# Patient Record
Sex: FEMALE | Race: WHITE | NOT HISPANIC OR LATINO | Employment: UNEMPLOYED | URBAN - METROPOLITAN AREA
[De-identification: names, ages, dates, MRNs, and addresses within clinical notes are randomized per-mention and may not be internally consistent; named-entity substitution may affect disease eponyms.]

---

## 2018-10-15 ENCOUNTER — TRANSCRIBE ORDERS (OUTPATIENT)
Dept: ADMINISTRATIVE | Facility: HOSPITAL | Age: 53
End: 2018-10-15

## 2018-10-15 ENCOUNTER — APPOINTMENT (OUTPATIENT)
Dept: LAB | Facility: HOSPITAL | Age: 53
End: 2018-10-15
Attending: FAMILY MEDICINE
Payer: COMMERCIAL

## 2018-10-15 DIAGNOSIS — N39.0 URINARY TRACT INFECTION WITHOUT HEMATURIA, SITE UNSPECIFIED: ICD-10-CM

## 2018-10-15 DIAGNOSIS — N39.0 URINARY TRACT INFECTION WITHOUT HEMATURIA, SITE UNSPECIFIED: Primary | ICD-10-CM

## 2018-10-15 LAB
BACTERIA UR QL AUTO: ABNORMAL /HPF
BILIRUB UR QL STRIP: NEGATIVE
CLARITY UR: ABNORMAL
COLOR UR: YELLOW
GLUCOSE UR STRIP-MCNC: NEGATIVE MG/DL
HGB UR QL STRIP.AUTO: ABNORMAL
KETONES UR STRIP-MCNC: NEGATIVE MG/DL
LEUKOCYTE ESTERASE UR QL STRIP: ABNORMAL
NITRITE UR QL STRIP: NEGATIVE
NON-SQ EPI CELLS URNS QL MICRO: ABNORMAL /HPF
PH UR STRIP.AUTO: 6.5 [PH] (ref 5–8)
PROT UR STRIP-MCNC: ABNORMAL MG/DL
RBC #/AREA URNS AUTO: ABNORMAL /HPF
SP GR UR STRIP.AUTO: 1.02 (ref 1–1.03)
UROBILINOGEN UR QL STRIP.AUTO: 0.2 E.U./DL
WBC #/AREA URNS AUTO: ABNORMAL /HPF

## 2018-10-15 PROCEDURE — 87086 URINE CULTURE/COLONY COUNT: CPT

## 2018-10-15 PROCEDURE — 87186 SC STD MICRODIL/AGAR DIL: CPT

## 2018-10-15 PROCEDURE — 87077 CULTURE AEROBIC IDENTIFY: CPT

## 2018-10-15 PROCEDURE — 81003 URINALYSIS AUTO W/O SCOPE: CPT

## 2018-10-15 PROCEDURE — 81001 URINALYSIS AUTO W/SCOPE: CPT

## 2018-10-17 LAB — BACTERIA UR CULT: ABNORMAL

## 2018-11-09 ENCOUNTER — TELEPHONE (OUTPATIENT)
Dept: FAMILY MEDICINE CLINIC | Facility: CLINIC | Age: 53
End: 2018-11-09

## 2019-02-01 DIAGNOSIS — G40.109 SPECIAL SENSORY ATTACKS (HCC): ICD-10-CM

## 2019-02-01 DIAGNOSIS — Z00.01 ENCOUNTER FOR WELL ADULT EXAM WITH ABNORMAL FINDINGS: Primary | ICD-10-CM

## 2019-02-01 DIAGNOSIS — E78.49 OTHER HYPERLIPIDEMIA: ICD-10-CM

## 2019-02-02 ENCOUNTER — APPOINTMENT (OUTPATIENT)
Dept: LAB | Facility: CLINIC | Age: 54
End: 2019-02-02
Payer: COMMERCIAL

## 2019-02-02 DIAGNOSIS — E78.49 OTHER HYPERLIPIDEMIA: ICD-10-CM

## 2019-02-02 DIAGNOSIS — Z00.01 ENCOUNTER FOR WELL ADULT EXAM WITH ABNORMAL FINDINGS: ICD-10-CM

## 2019-02-02 DIAGNOSIS — G40.109 SPECIAL SENSORY ATTACKS (HCC): ICD-10-CM

## 2019-02-02 LAB
ALBUMIN SERPL BCP-MCNC: 4 G/DL (ref 3.5–5)
ALP SERPL-CCNC: 115 U/L (ref 46–116)
ALT SERPL W P-5'-P-CCNC: 24 U/L (ref 12–78)
ANION GAP SERPL CALCULATED.3IONS-SCNC: 2 MMOL/L (ref 4–13)
AST SERPL W P-5'-P-CCNC: 21 U/L (ref 5–45)
BILIRUB SERPL-MCNC: 0.35 MG/DL (ref 0.2–1)
BUN SERPL-MCNC: 21 MG/DL (ref 5–25)
CALCIUM SERPL-MCNC: 8.6 MG/DL (ref 8.3–10.1)
CARBAMAZEPINE SERPL-MCNC: 12.9 UG/ML (ref 4–12)
CHLORIDE SERPL-SCNC: 99 MMOL/L (ref 100–108)
CHOLEST SERPL-MCNC: 283 MG/DL (ref 50–200)
CO2 SERPL-SCNC: 33 MMOL/L (ref 21–32)
CREAT SERPL-MCNC: 0.61 MG/DL (ref 0.6–1.3)
GFR SERPL CREATININE-BSD FRML MDRD: 102 ML/MIN/1.73SQ M
GLUCOSE P FAST SERPL-MCNC: 66 MG/DL (ref 65–99)
HDLC SERPL-MCNC: 135 MG/DL (ref 40–60)
LDLC SERPL CALC-MCNC: 137 MG/DL (ref 0–100)
NONHDLC SERPL-MCNC: 148 MG/DL
POTASSIUM SERPL-SCNC: 4.1 MMOL/L (ref 3.5–5.3)
PROT SERPL-MCNC: 7.7 G/DL (ref 6.4–8.2)
SODIUM SERPL-SCNC: 134 MMOL/L (ref 136–145)
T4 FREE SERPL-MCNC: 0.75 NG/DL (ref 0.76–1.46)
TRIGL SERPL-MCNC: 56 MG/DL
TSH SERPL DL<=0.05 MIU/L-ACNC: 2.71 UIU/ML (ref 0.36–3.74)

## 2019-02-02 PROCEDURE — 80061 LIPID PANEL: CPT

## 2019-02-02 PROCEDURE — 84443 ASSAY THYROID STIM HORMONE: CPT

## 2019-02-02 PROCEDURE — 80053 COMPREHEN METABOLIC PANEL: CPT

## 2019-02-02 PROCEDURE — 80156 ASSAY CARBAMAZEPINE TOTAL: CPT

## 2019-02-02 PROCEDURE — 36415 COLL VENOUS BLD VENIPUNCTURE: CPT

## 2019-02-02 PROCEDURE — 84439 ASSAY OF FREE THYROXINE: CPT

## 2019-02-14 ENCOUNTER — OFFICE VISIT (OUTPATIENT)
Dept: FAMILY MEDICINE CLINIC | Facility: CLINIC | Age: 54
End: 2019-02-14
Payer: COMMERCIAL

## 2019-02-14 VITALS
WEIGHT: 128.8 LBS | HEART RATE: 78 BPM | BODY MASS INDEX: 22.82 KG/M2 | SYSTOLIC BLOOD PRESSURE: 120 MMHG | DIASTOLIC BLOOD PRESSURE: 72 MMHG | HEIGHT: 63 IN | OXYGEN SATURATION: 97 %

## 2019-02-14 DIAGNOSIS — R56.9 SEIZURES (HCC): ICD-10-CM

## 2019-02-14 DIAGNOSIS — R56.9 SEIZURES (HCC): Primary | ICD-10-CM

## 2019-02-14 DIAGNOSIS — Z00.00 ANNUAL PHYSICAL EXAM: ICD-10-CM

## 2019-02-14 DIAGNOSIS — E78.2 MIXED HYPERLIPIDEMIA: Primary | ICD-10-CM

## 2019-02-14 PROCEDURE — 3008F BODY MASS INDEX DOCD: CPT | Performed by: FAMILY MEDICINE

## 2019-02-14 PROCEDURE — 1036F TOBACCO NON-USER: CPT | Performed by: FAMILY MEDICINE

## 2019-02-14 PROCEDURE — 99396 PREV VISIT EST AGE 40-64: CPT | Performed by: FAMILY MEDICINE

## 2019-02-14 RX ORDER — CARBAMAZEPINE 400 MG/1
400 TABLET, EXTENDED RELEASE ORAL 2 TIMES DAILY
COMMUNITY
End: 2019-02-14 | Stop reason: SDUPTHER

## 2019-02-14 RX ORDER — CARBAMAZEPINE 400 MG/1
400 TABLET, EXTENDED RELEASE ORAL 2 TIMES DAILY
COMMUNITY
End: 2020-04-16 | Stop reason: SDUPTHER

## 2019-02-14 RX ORDER — CARBAMAZEPINE 400 MG/1
400 TABLET, EXTENDED RELEASE ORAL 2 TIMES DAILY
Qty: 90 TABLET | Refills: 3 | Status: SHIPPED | OUTPATIENT
Start: 2019-02-14 | End: 2019-02-14 | Stop reason: CLARIF

## 2019-02-14 RX ORDER — CHLORAL HYDRATE 500 MG
1000 CAPSULE ORAL DAILY
COMMUNITY

## 2019-02-14 RX ORDER — VITAMIN B COMPLEX
1 CAPSULE ORAL DAILY
COMMUNITY

## 2019-02-14 RX ORDER — CARBAMAZEPINE 400 MG/1
400 TABLET, EXTENDED RELEASE ORAL 2 TIMES DAILY
Qty: 180 TABLET | Refills: 3 | Status: SHIPPED | OUTPATIENT
Start: 2019-02-14 | End: 2020-04-16 | Stop reason: SDUPTHER

## 2019-02-14 NOTE — ASSESSMENT & PLAN NOTE
Patient presents for mixed hyperlipidemia review of laboratory work showing cholesterol elevated at 283 now however her HDL is at 135  triglycerides good at 56 TSH 2 7 other labs including kidney liver functions are all good

## 2019-02-14 NOTE — PATIENT INSTRUCTIONS

## 2019-02-14 NOTE — PROGRESS NOTES
Assessment/Plan:       Problem List Items Addressed This Visit        Other    Mixed hyperlipidemia - Primary     Patient presents for mixed hyperlipidemia review of laboratory work showing cholesterol elevated at 283 now however her HDL is at 135  triglycerides good at 56 TSH 2 7 other labs including kidney liver functions are all good         Seizures ([facility] [address] )     Patient has history of seizure disorder on Tegretol carbamazepine levels are evaluated at this point on the current dosage of the medication  Carbamazepine level slightly high at 12 9 she will reduce the dosage to 400 mg twice daily now every day and re-evaluate the level at next office visit         Relevant Orders    Carbamazepine level, total    Carbamazepine level, free    Annual physical exam      Patient here for her annual exam and discussion regarding her lab work and medical problems                 Subjective:      Patient ID: Anel Rooney is a 52 y o  female  Presents for lab review and med eval   Feeling good overall      The following portions of the patient's history were reviewed and updated as appropriate: allergies, current medications, past family history, past medical history, past social history, past surgical history and problem list     Review of Systems   Constitutional: Negative for chills, fatigue and fever  HENT: Negative for congestion, nosebleeds, rhinorrhea, sinus pressure and sore throat  Eyes: Negative for discharge and redness  Respiratory: Negative for cough and shortness of breath  Cardiovascular: Negative for chest pain, palpitations and leg swelling  Gastrointestinal: Negative for abdominal pain, blood in stool and nausea  Endocrine: Negative for cold intolerance, heat intolerance and polyuria  Genitourinary: Negative for dysuria and frequency  Musculoskeletal: Negative for arthralgias, back pain and myalgias  Skin: Negative for rash     Neurological: Negative for dizziness, weakness and headaches  Hematological: Negative for adenopathy  Psychiatric/Behavioral: Negative for behavioral problems and sleep disturbance  The patient is not nervous/anxious  Objective:      /72 (BP Location: Left arm, Patient Position: Sitting)   Pulse 78   Ht 5' 3" (1 6 m)   Wt 58 4 kg (128 lb 12 8 oz)   SpO2 97%   BMI 22 82 kg/m²        Physical Exam   Constitutional: She is oriented to person, place, and time  She appears well-developed and well-nourished  No distress  HENT:   Head: Normocephalic and atraumatic  Right Ear: External ear normal    Left Ear: External ear normal    Nose: Nose normal    Mouth/Throat: Oropharynx is clear and moist  No oropharyngeal exudate  Eyes: Pupils are equal, round, and reactive to light  Conjunctivae and EOM are normal  Right eye exhibits no discharge  Left eye exhibits no discharge  No scleral icterus  Neck: Normal range of motion  No JVD present  No thyromegaly present  Cardiovascular: Normal rate, regular rhythm and normal heart sounds  No murmur heard  Pulmonary/Chest: Effort normal  She has no wheezes  She has no rales  She exhibits no tenderness  Abdominal: Soft  Bowel sounds are normal  She exhibits no distension and no mass  There is no tenderness  Musculoskeletal: Normal range of motion  She exhibits no edema, tenderness or deformity  Lymphadenopathy:     She has no cervical adenopathy  Neurological: She is alert and oriented to person, place, and time  She has normal reflexes  She displays normal reflexes  No cranial nerve deficit  Coordination normal    Skin: Skin is warm and dry  No rash noted  Psychiatric: She has a normal mood and affect  Her behavior is normal  Judgment and thought content normal    Nursing note and vitals reviewed  Data:    Laboratory Results: I have personally reviewed the pertinent laboratory results/reports   Radiology/Other Diagnostic Testing Results: I have personally reviewed pertinent reports  No results found for: WBC, HGB, HCT, MCV, PLT  Lab Results   Component Value Date    K 4 1 02/02/2019    CL 99 (L) 02/02/2019    CO2 33 (H) 02/02/2019    BUN 21 02/02/2019    CREATININE 0 61 02/02/2019    GLUF 66 02/02/2019    CALCIUM 8 6 02/02/2019    AST 21 02/02/2019    ALT 24 02/02/2019    ALKPHOS 115 02/02/2019    EGFR 102 02/02/2019     Lab Results   Component Value Date    CHOLESTEROL 283 (H) 02/02/2019     Lab Results   Component Value Date     (H) 02/02/2019     Lab Results   Component Value Date    LDLCALC 137 (H) 02/02/2019     Lab Results   Component Value Date    TRIG 56 02/02/2019     No results found for: [place], [place]  Lab Results   Component Value Date    XHG4NQHMZKSU 2 710 02/02/2019     No results found for: HGBA1C  No results found for: LARA Renee, DO

## 2019-02-14 NOTE — ASSESSMENT & PLAN NOTE
Patient has history of seizure disorder on Tegretol carbamazepine levels are evaluated at this point on the current dosage of the medication    Carbamazepine level slightly high at 12 9 she will reduce the dosage to 400 mg twice daily now every day and re-evaluate the level at next office visit

## 2019-05-06 ENCOUNTER — TELEPHONE (OUTPATIENT)
Dept: FAMILY MEDICINE CLINIC | Facility: CLINIC | Age: 54
End: 2019-05-06

## 2019-05-07 PROBLEM — Z00.00 ANNUAL PHYSICAL EXAM: Status: ACTIVE | Noted: 2019-05-07

## 2020-01-22 ENCOUNTER — TELEPHONE (OUTPATIENT)
Dept: FAMILY MEDICINE CLINIC | Facility: CLINIC | Age: 55
End: 2020-01-22

## 2020-01-22 DIAGNOSIS — E78.2 MIXED HYPERLIPIDEMIA: Primary | ICD-10-CM

## 2020-01-22 DIAGNOSIS — R56.9 SEIZURES (HCC): ICD-10-CM

## 2020-01-22 DIAGNOSIS — Z00.00 ANNUAL PHYSICAL EXAM: ICD-10-CM

## 2020-01-22 NOTE — TELEPHONE ENCOUNTER
Notify patient labs ordered for herself and for her  they can go to a [employer] and not have to stop here for the paper request

## 2020-01-22 NOTE — TELEPHONE ENCOUNTER
Patient needs orders for yearly labs, don't forget to include tegritol level   She will  on Thursday

## 2020-04-16 ENCOUNTER — TELEPHONE (OUTPATIENT)
Dept: FAMILY MEDICINE CLINIC | Facility: CLINIC | Age: 55
End: 2020-04-16

## 2020-04-16 ENCOUNTER — TELEMEDICINE (OUTPATIENT)
Dept: FAMILY MEDICINE CLINIC | Facility: CLINIC | Age: 55
End: 2020-04-16
Payer: COMMERCIAL

## 2020-04-16 ENCOUNTER — TELEPHONE (OUTPATIENT)
Dept: ADMINISTRATIVE | Facility: OTHER | Age: 55
End: 2020-04-16

## 2020-04-16 DIAGNOSIS — R56.9 SEIZURES (HCC): ICD-10-CM

## 2020-04-16 DIAGNOSIS — Z12.31 VISIT FOR SCREENING MAMMOGRAM: ICD-10-CM

## 2020-04-16 DIAGNOSIS — E78.2 MIXED HYPERLIPIDEMIA: Primary | ICD-10-CM

## 2020-04-16 PROCEDURE — 99215 OFFICE O/P EST HI 40 MIN: CPT | Performed by: FAMILY MEDICINE

## 2020-04-16 RX ORDER — CARBAMAZEPINE 400 MG/1
400 TABLET, EXTENDED RELEASE ORAL 2 TIMES DAILY
Qty: 200 TABLET | Refills: 3 | Status: SHIPPED | OUTPATIENT
Start: 2020-04-16 | End: 2021-02-19 | Stop reason: SDUPTHER

## 2020-04-16 RX ORDER — CARBAMAZEPINE 400 MG/1
400 TABLET, EXTENDED RELEASE ORAL 2 TIMES DAILY
Qty: 200 TABLET | Refills: 2 | Status: SHIPPED | OUTPATIENT
Start: 2020-04-16 | End: 2022-04-14 | Stop reason: ALTCHOICE

## 2020-05-13 LAB
ALBUMIN SERPL-MCNC: 4.1 G/DL (ref 3.8–4.9)
ALBUMIN/GLOB SERPL: 1.7 {RATIO} (ref 1.2–2.2)
ALP SERPL-CCNC: 91 IU/L (ref 39–117)
ALT SERPL-CCNC: 16 IU/L (ref 0–32)
AST SERPL-CCNC: 25 IU/L (ref 0–40)
BASOPHILS # BLD AUTO: 0 X10E3/UL (ref 0–0.2)
BASOPHILS NFR BLD AUTO: 1 %
BILIRUB SERPL-MCNC: 0.3 MG/DL (ref 0–1.2)
BUN SERPL-MCNC: 18 MG/DL (ref 6–24)
BUN/CREAT SERPL: 25 (ref 9–23)
CALCIUM SERPL-MCNC: 9 MG/DL (ref 8.7–10.2)
CARBAMAZEPINE SERPL-MCNC: 8.2 UG/ML (ref 4–12)
CHLORIDE SERPL-SCNC: 100 MMOL/L (ref 96–106)
CHOLEST SERPL-MCNC: 240 MG/DL (ref 100–199)
CO2 SERPL-SCNC: 26 MMOL/L (ref 20–29)
CREAT SERPL-MCNC: 0.72 MG/DL (ref 0.57–1)
EOSINOPHIL # BLD AUTO: 0.1 X10E3/UL (ref 0–0.4)
EOSINOPHIL NFR BLD AUTO: 2 %
ERYTHROCYTE [DISTWIDTH] IN BLOOD BY AUTOMATED COUNT: 12.6 % (ref 11.7–15.4)
GLOBULIN SER-MCNC: 2.4 G/DL (ref 1.5–4.5)
GLUCOSE SERPL-MCNC: 82 MG/DL (ref 65–99)
HCT VFR BLD AUTO: 42.4 % (ref 34–46.6)
HDLC SERPL-MCNC: 97 MG/DL
HGB BLD-MCNC: 14.2 G/DL (ref 11.1–15.9)
IMM GRANULOCYTES # BLD: 0 X10E3/UL (ref 0–0.1)
IMM GRANULOCYTES NFR BLD: 0 %
LDLC SERPL CALC-MCNC: 124 MG/DL (ref 0–99)
LYMPHOCYTES # BLD AUTO: 1.6 X10E3/UL (ref 0.7–3.1)
LYMPHOCYTES NFR BLD AUTO: 43 %
MCH RBC QN AUTO: 30.5 PG (ref 26.6–33)
MCHC RBC AUTO-ENTMCNC: 33.5 G/DL (ref 31.5–35.7)
MCV RBC AUTO: 91 FL (ref 79–97)
MONOCYTES # BLD AUTO: 0.3 X10E3/UL (ref 0.1–0.9)
MONOCYTES NFR BLD AUTO: 7 %
NEUTROPHILS # BLD AUTO: 1.7 X10E3/UL (ref 1.4–7)
NEUTROPHILS NFR BLD AUTO: 47 %
PLATELET # BLD AUTO: 165 X10E3/UL (ref 150–450)
POTASSIUM SERPL-SCNC: 4.5 MMOL/L (ref 3.5–5.2)
PROT SERPL-MCNC: 6.5 G/DL (ref 6–8.5)
RBC # BLD AUTO: 4.66 X10E6/UL (ref 3.77–5.28)
SL AMB EGFR AFRICAN AMERICAN: 108 ML/MIN/1.73
SL AMB EGFR NON AFRICAN AMERICAN: 94 ML/MIN/1.73
SL AMB VLDL CHOLESTEROL CALC: 19 MG/DL (ref 5–40)
SODIUM SERPL-SCNC: 140 MMOL/L (ref 134–144)
TRIGL SERPL-MCNC: 96 MG/DL (ref 0–149)
TSH SERPL DL<=0.005 MIU/L-ACNC: 3.44 UIU/ML (ref 0.45–4.5)
WBC # BLD AUTO: 3.7 X10E3/UL (ref 3.4–10.8)

## 2020-07-07 LAB — HCV AB SER-ACNC: NORMAL

## 2020-09-03 ENCOUNTER — TELEPHONE (OUTPATIENT)
Dept: ADMINISTRATIVE | Facility: OTHER | Age: 55
End: 2020-09-03

## 2020-09-03 ENCOUNTER — OFFICE VISIT (OUTPATIENT)
Dept: FAMILY MEDICINE CLINIC | Facility: CLINIC | Age: 55
End: 2020-09-03
Payer: COMMERCIAL

## 2020-09-03 VITALS
DIASTOLIC BLOOD PRESSURE: 82 MMHG | SYSTOLIC BLOOD PRESSURE: 122 MMHG | WEIGHT: 126.6 LBS | TEMPERATURE: 98.3 F | OXYGEN SATURATION: 98 % | BODY MASS INDEX: 23.3 KG/M2 | HEART RATE: 78 BPM | HEIGHT: 62 IN

## 2020-09-03 DIAGNOSIS — R56.9 SEIZURES (HCC): ICD-10-CM

## 2020-09-03 DIAGNOSIS — E78.2 MIXED HYPERLIPIDEMIA: Primary | ICD-10-CM

## 2020-09-03 DIAGNOSIS — M81.0 AGE-RELATED OSTEOPOROSIS WITHOUT CURRENT PATHOLOGICAL FRACTURE: ICD-10-CM

## 2020-09-03 DIAGNOSIS — Z00.00 ANNUAL PHYSICAL EXAM: ICD-10-CM

## 2020-09-03 PROCEDURE — 1036F TOBACCO NON-USER: CPT | Performed by: FAMILY MEDICINE

## 2020-09-03 PROCEDURE — 99396 PREV VISIT EST AGE 40-64: CPT | Performed by: FAMILY MEDICINE

## 2020-09-03 NOTE — ASSESSMENT & PLAN NOTE
Seizure disorder longstanding by history continue with Tegretol carbamazepine no change in dosage levels  Reviewed and followed by Neurology annually

## 2020-09-03 NOTE — PATIENT INSTRUCTIONS
Osteoporosis   WHAT YOU NEED TO KNOW:   Osteoporosis is a long-term medical condition that causes your bones to become weak, brittle, and more likely to fracture  Osteoporosis occurs when your body absorbs more bone than it makes  It is also caused by a lack of calcium and estrogen (female hormone)  DISCHARGE INSTRUCTIONS:   Return to the emergency department if:   · You have severe pain  Contact your healthcare provider if:   · You have increasing pain after a fall  · You have pain when you do your daily activities  · You have questions or concerns about your condition or care  Medicines:   · Medicines  may be given to prevent bone loss, build new bone, and increase estrogen  These medicines may be given as a pill or injection  Ask your healthcare provider for more information  · Take your medicine as directed  Contact your healthcare provider if you think your medicine is not helping or if you have side effects  Tell him of her if you are allergic to any medicine  Keep a list of the medicines, vitamins, and herbs you take  Include the amounts, and when and why you take them  Bring the list or the pill bottles to follow-up visits  Carry your medicine list with you in case of an emergency  Prevent bone loss:   · Eat healthy foods that are high in calcium  This helps keep your bones strong  Good sources of calcium are milk, cheese, broccoli, tofu, almonds, and canned salmon and sardines  · Increase your vitamin D intake  Vitamin D is in fish oils, some vegetables, and fortified milk, cereal, and bread  Vitamin D is also formed in the skin when it is exposed to the sun  Ask your healthcare provider how much sunlight is safe for you  · Drink liquids as directed  Ask your healthcare provider how much liquid to drink each day and which liquids are best for you  Do not have alcohol or caffeine  They decrease bone mineral density, which can weaken your bones  · Exercise regularly    Ask your healthcare provider about the best exercise plan for you  Weight bearing exercise for 30 minutes, 3 times a week can help build and strengthen bone  · Do not smoke  Nicotine and other chemicals in cigarettes and cigars can cause lung damage  Ask your healthcare provider for information if you currently smoke and need help to quit  E-cigarettes or smokeless tobacco still contain nicotine  Talk to your healthcare provider before you use these products  · Go to physical therapy as directed  A physical therapist teaches you exercises to help improve movement and muscle strength  Follow up with your healthcare provider as directed:  Write down your questions so you remember to ask them during your visits  © 2017 [facility] Information is for End User's use only and may not be sold, redistributed or otherwise used for commercial purposes  All illustrations and images included in CareNotes® are the copyrighted property of [employer]  or Sigifredo Escobedo  The above information is an  only  It is not intended as medical advice for individual conditions or treatments  Talk to your doctor, nurse or pharmacist before following any medical regimen to see if it is safe and effective for you

## 2020-09-03 NOTE — ASSESSMENT & PLAN NOTE
Osteoporosis noted by bone densitometry test DEXA scan done July 7th I reviewed this with her at this time and she is currently taking supplements with calcium and vitamin-D I recommend 1500 mg of calcium and vitamin-D minimum of 800 units which she is getting in her supplement but I recommend additional vitamin-D supplement to take with that at this 0 2 4958-3266 units daily and vitamin-D level can be repeated in about 3 months up to 6 months from now

## 2020-09-03 NOTE — TELEPHONE ENCOUNTER
Upon review of the In Basket request we were able to locate, review, and update the patient chart as requested for Mammogram     Any additional questions or concerns should be emailed to the Practice Liaisons via [email or web address] email, please do not reply via In Basket      Thank you  [place]

## 2020-09-03 NOTE — TELEPHONE ENCOUNTER
----- Message from Romana Gayle sent at 9/3/2020 10:05 AM EDT -----  Regarding: DEXA  09/03/20 10:05 AM    Hello, our patient Saint Barthelemy has had DEXA Scan completed/performed  Please assist in updating the patient chart by pulling a previous Electronic Medical Record (EMR) document  The previous EMR is Care Everywhere, [facility]  The date of service is 7/2020  Thank you,  THU Da Silva PG

## 2020-09-03 NOTE — PROGRESS NOTES
ADULT ANNUAL [address]    NAME: [place]  AGE: 60 y o  SEX: female  : 1965     DATE: 9/3/2020     Assessment and Plan:     Problem List Items Addressed This Visit        Musculoskeletal and Integument    Age-related osteoporosis without current pathological fracture     Osteoporosis noted by bone densitometry test DEXA scan done  I reviewed this with her at this time and she is currently taking supplements with calcium and vitamin-D I recommend 1500 mg of calcium and vitamin-D minimum of 800 units which she is getting in her supplement but I recommend additional vitamin-D supplement to take with that at this 0 2 [id number] units daily and vitamin-D level can be repeated in about 3 months up to 6 months from now            Other    Mixed hyperlipidemia - Primary     Patient returns today to discuss hyperlipidemia working with Omega 3 fish oils and fat-free diet avoiding saturated fats discussed again and diet information provided for her all labs reviewed at this time         Seizures ([address] )      Seizure disorder longstanding by history continue with Tegretol carbamazepine no change in dosage levels  Reviewed and followed by Neurology annually               Immunizations and preventive care screenings were discussed with patient today  Appropriate education was printed on patient's after visit summary  Counseling:  · Exercise: the importance of regular exercise/physical activity was discussed  Recommend exercise 3-5 times per week for at least 30 minutes  No follow-ups on file  BMI Counseling: Body mass index is 23 16 kg/m²  The BMI is below normal  Patient was advised to gain weight  Chief Complaint:     Chief Complaint   Patient presents with    Follow-up     Blood work done on 2020, Dexa and Mammo done on 2020 results are in care everywhere     HM     Due HIV screen   BMI note started  Physical Exam      History of Present Illness:     Adult Annual Physical   Patient here for a comprehensive physical exam  The patient reports problems - Seizure, hyperlipidemia, osteoporosis  Diet and Physical Activity  · Diet/Nutrition: well balanced diet, heart healthy (low sodium) diet, low fat diet and consuming 3-5 servings of fruits/vegetables daily  · Exercise: walking  Depression Screening  PHQ-9 Depression Screening    PHQ-9:    Frequency of the following problems over the past two weeks:            General Health  · Sleep: sleeps well  · Hearing: normal - bilateral   · Vision: no vision problems  · Dental: regular dental visits  /GYN Health  · Patient is:  · Last menstrual period:   · Contraceptive method:     Review of Systems:     Review of Systems   Constitutional: Negative for chills, fatigue and fever  HENT: Negative for congestion, nosebleeds, rhinorrhea, sinus pressure and sore throat  Eyes: Negative for discharge and redness  Respiratory: Negative for cough and shortness of breath  Cardiovascular: Negative for chest pain, palpitations and leg swelling  Gastrointestinal: Negative for abdominal pain, blood in stool and nausea  Endocrine: Negative for cold intolerance, heat intolerance and polyuria  Genitourinary: Negative for dysuria and frequency  Musculoskeletal: Negative for arthralgias, back pain and myalgias  Skin: Negative for rash  Neurological: Negative for dizziness, weakness and headaches  Hematological: Negative for adenopathy  Psychiatric/Behavioral: Negative for behavioral problems and sleep disturbance  The patient is not nervous/anxious  Past Medical History:     Past Medical History:   Diagnosis Date    Hyperlipidemia     Seizures ([facility] [address] )       Past Surgical History:     History reviewed  No pertinent surgical history     Social History:     E-Cigarette/Vaping    E-Cigarette Use Never User      E-Cigarette/Vaping Substances  Nicotine No     THC No     CBD No     Flavoring No     Other No     Unknown No      Social History     Socioeconomic History    Marital status: /Civil Union     Spouse name: None    Number of children: None    Years of education: None    Highest education level: None   Occupational History    None   Social Needs    Financial resource strain: None    Food insecurity     Worry: None     Inability: None    Transportation needs     Medical: None     Non-medical: None   Tobacco Use    Smoking status: Never Smoker    Smokeless tobacco: Never Used   Substance and Sexual Activity    Alcohol use: Not Currently    Drug use: Never    Sexual activity: Yes   Lifestyle    Physical activity     Days per week: None     Minutes per session: None    Stress: None   Relationships    Social connections     Talks on phone: None     Gets together: None     Attends L[employer] service: None     Active member of club or organization: None     Attends meetings of clubs or organizations: None     Relationship status: None    Intimate partner violence     Fear of current or ex partner: None     Emotionally abused: None     Physically abused: None     Forced sexual activity: None   Other Topics Concern    None   Social History Narrative    None      Family History:     Family History   Problem Relation Age of Onset    No Known Problems Mother     Cancer Father     Cancer Sister       Current Medications:     Current Outpatient Medications   Medication Sig Dispense Refill    b complex vitamins capsule Take 1 capsule by mouth daily      carBAMazepine (TEGretol XR) 400 mg 12 hr tablet Take 1 tablet (400 mg total) by mouth 2 (two) times a day 200 tablet 2    Multiple Vitamin (MULTI-VITAMIN DAILY PO) Take by mouth      Omega-3 Fatty Acids (FISH OIL) 1,000 mg Take 1,000 mg by mouth daily      TEGRETOL- MG 12 hr tablet Take 1 tablet (400 mg total) by mouth 2 (two) times a day 200 tablet 3     No current facility-administered medications for this visit  Allergies:     No Known Allergies   Physical Exam:     /82 (BP Location: Left arm, Patient Position: Sitting, Cuff Size: Adult)   Pulse 78   Temp 98 3 °F (36 8 °C) (Tympanic)   Ht 5' 2" (1 575 m)   Wt 57 4 kg (126 lb 9 6 oz)   SpO2 98%   BMI 23 16 kg/m²     Physical Exam  Vitals signs reviewed  Constitutional:       Appearance: Normal appearance  She is normal weight  HENT:      Head: Normocephalic and atraumatic  Right Ear: Tympanic membrane, ear canal and external ear normal       Left Ear: Tympanic membrane, ear canal and external ear normal       Nose: Nose normal       Mouth/Throat:      Mouth: Mucous membranes are moist    Eyes:      Extraocular Movements: Extraocular movements intact  Conjunctiva/sclera: Conjunctivae normal       Pupils: Pupils are equal, round, and reactive to light  Neck:      Musculoskeletal: Normal range of motion  Cardiovascular:      Rate and Rhythm: Normal rate and regular rhythm  Pulmonary:      Effort: Pulmonary effort is normal    Abdominal:      General: Abdomen is flat  Skin:     General: Skin is warm  Capillary Refill: Capillary refill takes less than 2 seconds  Neurological:      General: No focal deficit present  Mental Status: She is alert  Psychiatric:         Mood and Affect: Mood normal          Thought Content:  Thought content normal          Judgment: Judgment normal           DO JOSE A Norris 99

## 2020-09-03 NOTE — TELEPHONE ENCOUNTER
Upon review of the In Basket request we were able to locate, review, and update the patient chart as requested for DEXA Scan and Mammogram     Any additional questions or concerns should be emailed to the Practice Liaisons via [email or web address] email, please do not reply via In Basket      Thank you  Lincoln Jaeger

## 2020-09-03 NOTE — TELEPHONE ENCOUNTER
----- Message from Romana Garrett sent at 9/3/2020 10:06 AM EDT -----  Regarding: Mammo  09/03/20 10:06 AM    Hello, our patient [place] Bart[place]my has had Mammogram completed/performed  Please assist in updating the patient chart by pulling a previous Electronic Medical Record (EMR) document  The previous EMR is Care Everywhere, [facility]  The date of service is 7/2020  Thank you,  THU Ross PG

## 2021-02-19 DIAGNOSIS — E55.9 VITAMIN D DEFICIENCY: Primary | ICD-10-CM

## 2021-02-19 DIAGNOSIS — R56.9 SEIZURES (HCC): ICD-10-CM

## 2021-02-19 RX ORDER — CARBAMAZEPINE 400 MG/1
400 TABLET, EXTENDED RELEASE ORAL 2 TIMES DAILY
Qty: 200 TABLET | Refills: 3 | Status: SHIPPED | OUTPATIENT
Start: 2021-02-19 | End: 2022-04-14 | Stop reason: SDUPTHER

## 2021-04-13 DIAGNOSIS — Z23 ENCOUNTER FOR IMMUNIZATION: ICD-10-CM

## 2021-05-04 LAB
25(OH)D3+25(OH)D2 SERPL-MCNC: 56.5 NG/ML (ref 30–100)
ALBUMIN SERPL-MCNC: 4.2 G/DL (ref 3.8–4.9)
ALBUMIN/GLOB SERPL: 1.7 {RATIO} (ref 1.2–2.2)
ALP SERPL-CCNC: 96 IU/L (ref 39–117)
ALT SERPL-CCNC: 15 IU/L (ref 0–32)
AST SERPL-CCNC: 19 IU/L (ref 0–40)
BASOPHILS # BLD AUTO: 0 X10E3/UL (ref 0–0.2)
BASOPHILS NFR BLD AUTO: 1 %
BILIRUB SERPL-MCNC: 0.3 MG/DL (ref 0–1.2)
BUN SERPL-MCNC: 19 MG/DL (ref 6–24)
BUN/CREAT SERPL: 26 (ref 9–23)
CALCIUM SERPL-MCNC: 9 MG/DL (ref 8.7–10.2)
CARBAMAZEPINE FREE SERPL-MCNC: 1.7 UG/ML (ref 0.6–4.2)
CARBAMAZEPINE SERPL-MCNC: 6.7 UG/ML (ref 4–12)
CHLORIDE SERPL-SCNC: 97 MMOL/L (ref 96–106)
CHOLEST SERPL-MCNC: 257 MG/DL (ref 100–199)
CO2 SERPL-SCNC: 29 MMOL/L (ref 20–29)
CREAT SERPL-MCNC: 0.74 MG/DL (ref 0.57–1)
EOSINOPHIL # BLD AUTO: 0.3 X10E3/UL (ref 0–0.4)
EOSINOPHIL NFR BLD AUTO: 6 %
ERYTHROCYTE [DISTWIDTH] IN BLOOD BY AUTOMATED COUNT: 11.9 % (ref 11.7–15.4)
GLOBULIN SER-MCNC: 2.5 G/DL (ref 1.5–4.5)
GLUCOSE SERPL-MCNC: 74 MG/DL (ref 65–99)
HCT VFR BLD AUTO: 44.7 % (ref 34–46.6)
HDLC SERPL-MCNC: 108 MG/DL
HGB BLD-MCNC: 14.8 G/DL (ref 11.1–15.9)
IMM GRANULOCYTES # BLD: 0 X10E3/UL (ref 0–0.1)
IMM GRANULOCYTES NFR BLD: 0 %
LDLC SERPL CALC-MCNC: 137 MG/DL (ref 0–99)
LYMPHOCYTES # BLD AUTO: 1.8 X10E3/UL (ref 0.7–3.1)
LYMPHOCYTES NFR BLD AUTO: 38 %
MCH RBC QN AUTO: 30.1 PG (ref 26.6–33)
MCHC RBC AUTO-ENTMCNC: 33.1 G/DL (ref 31.5–35.7)
MCV RBC AUTO: 91 FL (ref 79–97)
MONOCYTES # BLD AUTO: 0.4 X10E3/UL (ref 0.1–0.9)
MONOCYTES NFR BLD AUTO: 8 %
NEUTROPHILS # BLD AUTO: 2.2 X10E3/UL (ref 1.4–7)
NEUTROPHILS NFR BLD AUTO: 47 %
PLATELET # BLD AUTO: 193 X10E3/UL (ref 150–450)
POTASSIUM SERPL-SCNC: 4.6 MMOL/L (ref 3.5–5.2)
PROT SERPL-MCNC: 6.7 G/DL (ref 6–8.5)
RBC # BLD AUTO: 4.92 X10E6/UL (ref 3.77–5.28)
SL AMB EGFR AFRICAN AMERICAN: 104 ML/MIN/1.73
SL AMB EGFR NON AFRICAN AMERICAN: 90 ML/MIN/1.73
SL AMB VLDL CHOLESTEROL CALC: 12 MG/DL (ref 5–40)
SODIUM SERPL-SCNC: 137 MMOL/L (ref 134–144)
TRIGL SERPL-MCNC: 73 MG/DL (ref 0–149)
TSH SERPL DL<=0.005 MIU/L-ACNC: 3.51 UIU/ML (ref 0.45–4.5)
WBC # BLD AUTO: 4.6 X10E3/UL (ref 3.4–10.8)

## 2021-09-13 ENCOUNTER — TELEPHONE (OUTPATIENT)
Dept: ADMINISTRATIVE | Facility: OTHER | Age: 56
End: 2021-09-13

## 2021-09-13 NOTE — TELEPHONE ENCOUNTER
----- Message from Romana Arcos sent at 9/10/2021  7:06 AM EDT -----  Regarding: Katie Petersen  09/10/21 7:06 AM    Hello, our patient [place] has had Mammogram completed/performed  Please assist in updating the patient chart by pulling a previous Electronic Medical Record (EMR) document  The previous EMR is Care Everywhere  The date of service is 9/2021  Thank you,  THU Irwin PG

## 2021-09-16 NOTE — TELEPHONE ENCOUNTER
Upon review of the In Basket request we were able to locate, review, and update the patient chart as requested for Mammogram     Any additional questions or concerns should be emailed to the Practice Liaisons via [email or web address] email, please do not reply via In Basket      Thank you  Mary Soliz

## 2021-11-10 ENCOUNTER — TELEPHONE (OUTPATIENT)
Dept: FAMILY MEDICINE CLINIC | Facility: CLINIC | Age: 56
End: 2021-11-10

## 2021-11-10 DIAGNOSIS — Z12.31 VISIT FOR SCREENING MAMMOGRAM: Primary | ICD-10-CM

## 2021-11-10 DIAGNOSIS — Z12.11 SCREENING FOR COLON CANCER: ICD-10-CM

## 2022-03-08 DIAGNOSIS — Z13.228 SCREENING FOR METABOLIC DISORDER: ICD-10-CM

## 2022-03-08 DIAGNOSIS — E55.9 VITAMIN D DEFICIENCY: ICD-10-CM

## 2022-03-08 DIAGNOSIS — Z13.29 SCREENING FOR THYROID DISORDER: ICD-10-CM

## 2022-03-08 DIAGNOSIS — E78.5 HYPERLIPIDEMIA, UNSPECIFIED HYPERLIPIDEMIA TYPE: Primary | ICD-10-CM

## 2022-03-08 DIAGNOSIS — Z13.0 SCREENING FOR BLOOD DISEASE: ICD-10-CM

## 2022-03-08 DIAGNOSIS — R56.9 SEIZURE (HCC): ICD-10-CM

## 2022-03-17 LAB — COLOGUARD RESULT REPORTABLE: NEGATIVE

## 2022-04-02 LAB
25(OH)D3+25(OH)D2 SERPL-MCNC: 51.1 NG/ML (ref 30–100)
ALBUMIN SERPL-MCNC: 4.3 G/DL (ref 3.8–4.9)
ALBUMIN/GLOB SERPL: 1.8 {RATIO} (ref 1.2–2.2)
ALP SERPL-CCNC: 95 IU/L (ref 44–121)
ALT SERPL-CCNC: 16 IU/L (ref 0–32)
AST SERPL-CCNC: 19 IU/L (ref 0–40)
BASOPHILS # BLD AUTO: 0 X10E3/UL (ref 0–0.2)
BASOPHILS NFR BLD AUTO: 1 %
BILIRUB SERPL-MCNC: 0.3 MG/DL (ref 0–1.2)
BUN SERPL-MCNC: 16 MG/DL (ref 6–24)
BUN/CREAT SERPL: 21 (ref 9–23)
CALCIUM SERPL-MCNC: 9.2 MG/DL (ref 8.7–10.2)
CARBAMAZEPINE SERPL-MCNC: 8.4 UG/ML (ref 4–12)
CHLORIDE SERPL-SCNC: 96 MMOL/L (ref 96–106)
CHOLEST SERPL-MCNC: 289 MG/DL (ref 100–199)
CO2 SERPL-SCNC: 25 MMOL/L (ref 20–29)
CREAT SERPL-MCNC: 0.78 MG/DL (ref 0.57–1)
EGFR: 88 ML/MIN/1.73
EOSINOPHIL # BLD AUTO: 0.2 X10E3/UL (ref 0–0.4)
EOSINOPHIL NFR BLD AUTO: 5 %
ERYTHROCYTE [DISTWIDTH] IN BLOOD BY AUTOMATED COUNT: 12.2 % (ref 11.7–15.4)
GLOBULIN SER-MCNC: 2.4 G/DL (ref 1.5–4.5)
GLUCOSE SERPL-MCNC: 76 MG/DL (ref 65–99)
HCT VFR BLD AUTO: 42.2 % (ref 34–46.6)
HDLC SERPL-MCNC: 120 MG/DL
HGB BLD-MCNC: 14.1 G/DL (ref 11.1–15.9)
IMM GRANULOCYTES # BLD: 0 X10E3/UL (ref 0–0.1)
IMM GRANULOCYTES NFR BLD: 0 %
LDLC SERPL CALC-MCNC: 162 MG/DL (ref 0–99)
LYMPHOCYTES # BLD AUTO: 1.8 X10E3/UL (ref 0.7–3.1)
LYMPHOCYTES NFR BLD AUTO: 41 %
MCH RBC QN AUTO: 30 PG (ref 26.6–33)
MCHC RBC AUTO-ENTMCNC: 33.4 G/DL (ref 31.5–35.7)
MCV RBC AUTO: 90 FL (ref 79–97)
MONOCYTES # BLD AUTO: 0.4 X10E3/UL (ref 0.1–0.9)
MONOCYTES NFR BLD AUTO: 9 %
NEUTROPHILS # BLD AUTO: 2 X10E3/UL (ref 1.4–7)
NEUTROPHILS NFR BLD AUTO: 44 %
PLATELET # BLD AUTO: 173 X10E3/UL (ref 150–450)
POTASSIUM SERPL-SCNC: 4.2 MMOL/L (ref 3.5–5.2)
PROT SERPL-MCNC: 6.7 G/DL (ref 6–8.5)
RBC # BLD AUTO: 4.7 X10E6/UL (ref 3.77–5.28)
SL AMB VLDL CHOLESTEROL CALC: 7 MG/DL (ref 5–40)
SODIUM SERPL-SCNC: 137 MMOL/L (ref 134–144)
TRIGL SERPL-MCNC: 53 MG/DL (ref 0–149)
TSH SERPL DL<=0.005 MIU/L-ACNC: 4.19 UIU/ML (ref 0.45–4.5)
WBC # BLD AUTO: 4.5 X10E3/UL (ref 3.4–10.8)

## 2022-04-14 ENCOUNTER — OFFICE VISIT (OUTPATIENT)
Dept: FAMILY MEDICINE CLINIC | Facility: CLINIC | Age: 57
End: 2022-04-14
Payer: COMMERCIAL

## 2022-04-14 VITALS
DIASTOLIC BLOOD PRESSURE: 70 MMHG | TEMPERATURE: 99.2 F | BODY MASS INDEX: 23 KG/M2 | OXYGEN SATURATION: 95 % | HEART RATE: 72 BPM | HEIGHT: 62 IN | SYSTOLIC BLOOD PRESSURE: 100 MMHG | WEIGHT: 125 LBS

## 2022-04-14 DIAGNOSIS — M25.551 BILATERAL HIP PAIN: ICD-10-CM

## 2022-04-14 DIAGNOSIS — M81.0 AGE-RELATED OSTEOPOROSIS WITHOUT CURRENT PATHOLOGICAL FRACTURE: Primary | ICD-10-CM

## 2022-04-14 DIAGNOSIS — E78.2 MIXED HYPERLIPIDEMIA: ICD-10-CM

## 2022-04-14 DIAGNOSIS — R56.9 SEIZURES (HCC): ICD-10-CM

## 2022-04-14 DIAGNOSIS — M25.552 BILATERAL HIP PAIN: ICD-10-CM

## 2022-04-14 PROCEDURE — 3008F BODY MASS INDEX DOCD: CPT | Performed by: FAMILY MEDICINE

## 2022-04-14 PROCEDURE — 3725F SCREEN DEPRESSION PERFORMED: CPT | Performed by: FAMILY MEDICINE

## 2022-04-14 PROCEDURE — 99396 PREV VISIT EST AGE 40-64: CPT | Performed by: FAMILY MEDICINE

## 2022-04-14 PROCEDURE — 1036F TOBACCO NON-USER: CPT | Performed by: FAMILY MEDICINE

## 2022-04-14 RX ORDER — ROSUVASTATIN CALCIUM 10 MG/1
10 TABLET, COATED ORAL DAILY
Qty: 90 TABLET | Refills: 3 | Status: SHIPPED | OUTPATIENT
Start: 2022-04-14

## 2022-04-14 RX ORDER — CARBAMAZEPINE 400 MG/1
400 TABLET, EXTENDED RELEASE ORAL 2 TIMES DAILY
Qty: 180 TABLET | Refills: 3 | Status: SHIPPED | OUTPATIENT
Start: 2022-04-14

## 2022-04-14 NOTE — ASSESSMENT & PLAN NOTE
Bilateral hip pain right worse than left patient will work on an exercise program to strengthen and stretch her hips check x-rays bilaterally

## 2022-04-14 NOTE — ASSESSMENT & PLAN NOTE
Mixed hyperlipidemia with rise in total cholesterol HDL and LDL up this year continuing on the same diet now agreeable to start a trial on a statin medication and follow-up with laboratory work after that    Repeat lab work in 1 month

## 2022-04-14 NOTE — PROGRESS NOTES
ADULT ANNUAL [address]    NAME: [place]  AGE: 60 y o  SEX: female  : 1965     DATE: 2022     Assessment and Plan:     Problem List Items Addressed This Visit        Musculoskeletal and Integument    Age-related osteoporosis without current pathological fracture - Primary     Stable patient is on supplementation with calcium along with vitamin-C D magnesium and strontium  She will check a bone density test this summer in July and follow-up after that continue with weight-bearing daily exercise  Relevant Orders    DXA bone density spine hip and pelvis       Other    Mixed hyperlipidemia     Mixed hyperlipidemia with rise in total cholesterol HDL and LDL up this year continuing on the same diet now agreeable to start a trial on a statin medication and follow-up with laboratory work after that  Repeat lab work in 1 month         Relevant Medications    rosuvastatin (CRESTOR) 10 MG tablet    Other Relevant Orders    Comprehensive metabolic panel    Lipid panel    Seizures ([address] )     Stable no seizures now continuing current medications         Relevant Medications    TEGretol- MG 12 hr tablet    Bilateral hip pain     Bilateral hip pain right worse than left patient will work on an exercise program to strengthen and stretch her hips check x-rays bilaterally         Relevant Orders    XR hip/pelv 2-3 vws right if performed    XR hip/pelv 2-3 vws left if performed          Immunizations and preventive care screenings were discussed with patient today  Appropriate education was printed on patient's after visit summary  Counseling:  Alcohol/drug use: discussed moderation in alcohol intake, the recommendations for healthy alcohol use, and avoidance of illicit drug use  Dental Health: discussed importance of regular tooth brushing, flossing, and dental visits    Injury prevention: discussed safety/seat belts, safety helmets, smoke detectors, carbon dioxide detectors, and smoking near bedding or upholstery  · Exercise: the importance of regular exercise/physical activity was discussed  Recommend exercise 3-5 times per week for at least 30 minutes  Return in about 1 year (around 4/14/2023) for Annual physical      Chief Complaint:     Chief Complaint   Patient presents with    Physical Exam      History of Present Illness:     Adult Annual Physical   Patient here for a comprehensive physical exam  The patient reports no problems  Diet and Physical Activity  · Diet/Nutrition: well balanced diet  · Exercise: no formal exercise  Depression Screening  PHQ-2/9 Depression Screening    Little interest or pleasure in doing things: 0 - not at all  Feeling down, depressed, or hopeless: 0 - not at all  PHQ-2 Score: 0  PHQ-2 Interpretation: Negative depression screen       General Health  · Sleep: sleeps well  · Hearing: normal - bilateral   · Vision: no vision problems  · Dental: regular dental visits  /GYN Health  · Patient is: postmenopausal  · Last menstrual period:   · Contraceptive method:       Review of Systems:     Review of Systems   Constitutional: Negative for chills, fatigue and fever  HENT: Negative for congestion, nosebleeds, rhinorrhea, sinus pressure and sore throat  Eyes: Negative for discharge and redness  Respiratory: Negative for cough and shortness of breath  Cardiovascular: Negative for chest pain, palpitations and leg swelling  Gastrointestinal: Negative for abdominal pain, blood in stool and nausea  Endocrine: Negative for cold intolerance, heat intolerance and polyuria  Genitourinary: Negative for dysuria and frequency  Musculoskeletal: Positive for arthralgias and back pain  Negative for myalgias  Bilateral hip pain right worse than left   Skin: Negative for rash  Neurological: Negative for dizziness, weakness and headaches     Hematological: Negative for adenopathy  Psychiatric/Behavioral: Negative for behavioral problems and sleep disturbance  The patient is not nervous/anxious  Past Medical History:     Past Medical History:   Diagnosis Date    Hyperlipidemia     Seizures ([address] )       Past Surgical History:     History reviewed  No pertinent surgical history  Social History:     Social History     Socioeconomic History    Marital status: /Civil Union     Spouse name: None    Number of children: None    Years of education: None    Highest education level: None   Occupational History    None   Tobacco Use    Smoking status: Never Smoker    Smokeless tobacco: Never Used   Vaping Use    Vaping Use: Never used   Substance and Sexual Activity    Alcohol use: Not Currently    Drug use: Never    Sexual activity: Yes   Other Topics Concern    None   Social History Narrative    None     Social Determinants of Health     Financial Resource Strain: Not on file   Food Insecurity: Not on file   Transportation Needs: Not on file   Physical Activity: Not on file   Stress: Not on file   Social Connections: Not on file   Intimate Partner Violence: Not on file   Housing Stability: Not on file      Family History:     Family History   Problem Relation Age of Onset    No Known Problems Mother     Cancer Father     Cancer Sister       Current Medications:     Current Outpatient Medications   Medication Sig Dispense Refill    b complex vitamins capsule Take 1 capsule by mouth daily      CALCIUM-MAGNESIUM-VITAMIN D PO Take by mouth      Omega-3 Fatty Acids (FISH OIL) 1,000 mg Take 1,000 mg by mouth daily      TEGretol- MG 12 hr tablet Take 1 tablet (400 mg total) by mouth 2 (two) times a day 180 tablet 3    VITAMIN D PO Take by mouth      rosuvastatin (CRESTOR) 10 MG tablet Take 1 tablet (10 mg total) by mouth daily 90 tablet 3     No current facility-administered medications for this visit        Allergies:     No Known Allergies   Physical Exam:     /70 (BP Location: Left arm, Patient Position: Sitting)   Pulse 72   Temp 99 2 °F (37 3 °C)   Ht 5' 2" (1 575 m)   Wt 56 7 kg (125 lb)   SpO2 95%   BMI 22 86 kg/m²     Physical Exam  Vitals and nursing note reviewed  Constitutional:       General: She is not in acute distress  Appearance: Normal appearance  She is well-developed  She is obese  HENT:      Head: Normocephalic and atraumatic  Right Ear: Tympanic membrane, ear canal and external ear normal       Left Ear: Tympanic membrane, ear canal and external ear normal       Nose: Nose normal       Mouth/Throat:      Mouth: Mucous membranes are moist       Pharynx: Oropharynx is clear  Eyes:      Extraocular Movements: Extraocular movements intact  Conjunctiva/sclera: Conjunctivae normal    Cardiovascular:      Rate and Rhythm: Normal rate and regular rhythm  Pulses: Normal pulses  Heart sounds: Normal heart sounds  No murmur heard  Pulmonary:      Effort: Pulmonary effort is normal  No respiratory distress  Breath sounds: Normal breath sounds  Abdominal:      Palpations: Abdomen is soft  Tenderness: There is no abdominal tenderness  Musculoskeletal:         General: Normal range of motion  Cervical back: Normal range of motion and neck supple  Skin:     General: Skin is warm and dry  Capillary Refill: Capillary refill takes less than 2 seconds  Neurological:      General: No focal deficit present  Mental Status: She is alert and oriented to person, place, and time  Mental status is at baseline  Psychiatric:         Mood and Affect: Mood normal          Behavior: Behavior normal          Thought Content:  Thought content normal          Judgment: Judgment normal           DO JOSE A [address]

## 2022-04-14 NOTE — ASSESSMENT & PLAN NOTE
Stable patient is on supplementation with calcium along with vitamin-C D magnesium and strontium  She will check a bone density test this summer in July and follow-up after that continue with weight-bearing daily exercise

## 2022-05-28 LAB
ALBUMIN SERPL-MCNC: 4.2 G/DL (ref 3.8–4.9)
ALBUMIN/GLOB SERPL: 1.5 {RATIO} (ref 1.2–2.2)
ALP SERPL-CCNC: 100 IU/L (ref 44–121)
ALT SERPL-CCNC: 16 IU/L (ref 0–32)
AST SERPL-CCNC: 19 IU/L (ref 0–40)
BILIRUB SERPL-MCNC: 0.3 MG/DL (ref 0–1.2)
BUN SERPL-MCNC: 17 MG/DL (ref 6–24)
BUN/CREAT SERPL: 22 (ref 9–23)
CALCIUM SERPL-MCNC: 9.3 MG/DL (ref 8.7–10.2)
CHLORIDE SERPL-SCNC: 98 MMOL/L (ref 96–106)
CHOLEST SERPL-MCNC: 225 MG/DL (ref 100–199)
CO2 SERPL-SCNC: 25 MMOL/L (ref 20–29)
CREAT SERPL-MCNC: 0.79 MG/DL (ref 0.57–1)
EGFR: 87 ML/MIN/1.73
GLOBULIN SER-MCNC: 2.8 G/DL (ref 1.5–4.5)
GLUCOSE SERPL-MCNC: 81 MG/DL (ref 65–99)
HDLC SERPL-MCNC: 120 MG/DL
LDLC SERPL CALC-MCNC: 95 MG/DL (ref 0–99)
POTASSIUM SERPL-SCNC: 4.7 MMOL/L (ref 3.5–5.2)
PROT SERPL-MCNC: 7 G/DL (ref 6–8.5)
SL AMB VLDL CHOLESTEROL CALC: 10 MG/DL (ref 5–40)
SODIUM SERPL-SCNC: 138 MMOL/L (ref 134–144)
TRIGL SERPL-MCNC: 59 MG/DL (ref 0–149)

## 2023-02-17 DIAGNOSIS — M81.0 AGE-RELATED OSTEOPOROSIS WITHOUT CURRENT PATHOLOGICAL FRACTURE: ICD-10-CM

## 2023-03-30 DIAGNOSIS — Z13.21 ENCOUNTER FOR VITAMIN DEFICIENCY SCREENING: ICD-10-CM

## 2023-03-30 DIAGNOSIS — Z13.228 SCREENING FOR METABOLIC DISORDER: ICD-10-CM

## 2023-03-30 DIAGNOSIS — M81.0 AGE-RELATED OSTEOPOROSIS WITHOUT CURRENT PATHOLOGICAL FRACTURE: ICD-10-CM

## 2023-03-30 DIAGNOSIS — Z00.00 ROUTINE MEDICAL EXAM: ICD-10-CM

## 2023-03-30 DIAGNOSIS — Z13.29 SCREENING FOR THYROID DISORDER: ICD-10-CM

## 2023-03-30 DIAGNOSIS — Z13.0 SCREENING FOR BLOOD DISEASE: ICD-10-CM

## 2023-03-30 DIAGNOSIS — E78.2 MIXED HYPERLIPIDEMIA: Primary | ICD-10-CM

## 2024-04-25 LAB
ALBUMIN SERPL-MCNC: 4.4 G/DL (ref 3.8–4.9)
ALBUMIN/GLOB SERPL: 1.8 {RATIO} (ref 1.2–2.2)
ALP SERPL-CCNC: 109 IU/L (ref 44–121)
ALT SERPL-CCNC: 14 IU/L (ref 0–32)
AST SERPL-CCNC: 19 IU/L (ref 0–40)
BASOPHILS # BLD AUTO: 0 X10E3/UL (ref 0–0.2)
BASOPHILS NFR BLD AUTO: 1 %
BILIRUB SERPL-MCNC: 0.3 MG/DL (ref 0–1.2)
BUN SERPL-MCNC: 20 MG/DL (ref 8–27)
BUN/CREAT SERPL: 26 (ref 12–28)
CALCIUM SERPL-MCNC: 9.1 MG/DL (ref 8.7–10.3)
CARBAMAZEPINE SERPL-MCNC: 9.1 UG/ML (ref 4–12)
CHLORIDE SERPL-SCNC: 101 MMOL/L (ref 96–106)
CHOLEST SERPL-MCNC: 215 MG/DL (ref 100–199)
CO2 SERPL-SCNC: 26 MMOL/L (ref 20–29)
CREAT SERPL-MCNC: 0.77 MG/DL (ref 0.57–1)
EGFR: 88 ML/MIN/1.73
EOSINOPHIL # BLD AUTO: 0.2 X10E3/UL (ref 0–0.4)
EOSINOPHIL NFR BLD AUTO: 4 %
ERYTHROCYTE [DISTWIDTH] IN BLOOD BY AUTOMATED COUNT: 12.4 % (ref 11.7–15.4)
GLOBULIN SER-MCNC: 2.4 G/DL (ref 1.5–4.5)
GLUCOSE SERPL-MCNC: 88 MG/DL (ref 70–99)
HCT VFR BLD AUTO: 43 % (ref 34–46.6)
HDLC SERPL-MCNC: 104 MG/DL
HGB BLD-MCNC: 14.4 G/DL (ref 11.1–15.9)
IMM GRANULOCYTES # BLD: 0 X10E3/UL (ref 0–0.1)
IMM GRANULOCYTES NFR BLD: 0 %
LDLC SERPL CALC-MCNC: 98 MG/DL (ref 0–99)
LYMPHOCYTES # BLD AUTO: 1.5 X10E3/UL (ref 0.7–3.1)
LYMPHOCYTES NFR BLD AUTO: 32 %
MCH RBC QN AUTO: 30.9 PG (ref 26.6–33)
MCHC RBC AUTO-ENTMCNC: 33.5 G/DL (ref 31.5–35.7)
MCV RBC AUTO: 92 FL (ref 79–97)
MONOCYTES # BLD AUTO: 0.4 X10E3/UL (ref 0.1–0.9)
MONOCYTES NFR BLD AUTO: 8 %
NEUTROPHILS # BLD AUTO: 2.7 X10E3/UL (ref 1.4–7)
NEUTROPHILS NFR BLD AUTO: 55 %
PLATELET # BLD AUTO: 182 X10E3/UL (ref 150–450)
POTASSIUM SERPL-SCNC: 4.7 MMOL/L (ref 3.5–5.2)
PROT SERPL-MCNC: 6.8 G/DL (ref 6–8.5)
RBC # BLD AUTO: 4.66 X10E6/UL (ref 3.77–5.28)
SL AMB VLDL CHOLESTEROL CALC: 13 MG/DL (ref 5–40)
SODIUM SERPL-SCNC: 140 MMOL/L (ref 134–144)
TRIGL SERPL-MCNC: 76 MG/DL (ref 0–149)
TSH SERPL DL<=0.005 MIU/L-ACNC: 2.36 UIU/ML (ref 0.45–4.5)
WBC # BLD AUTO: 4.8 X10E3/UL (ref 3.4–10.8)

## 2024-05-02 ENCOUNTER — OFFICE VISIT (OUTPATIENT)
Dept: FAMILY MEDICINE CLINIC | Facility: CLINIC | Age: 59
End: 2024-05-02
Payer: COMMERCIAL

## 2024-05-02 VITALS
BODY MASS INDEX: 20.18 KG/M2 | HEART RATE: 63 BPM | HEIGHT: 64 IN | TEMPERATURE: 98.3 F | OXYGEN SATURATION: 97 % | DIASTOLIC BLOOD PRESSURE: 70 MMHG | RESPIRATION RATE: 18 BRPM | SYSTOLIC BLOOD PRESSURE: 100 MMHG | WEIGHT: 118.2 LBS

## 2024-05-02 DIAGNOSIS — R56.9 SEIZURES (HCC): Primary | ICD-10-CM

## 2024-05-02 DIAGNOSIS — R56.9 SEIZURES (HCC): ICD-10-CM

## 2024-05-02 DIAGNOSIS — E78.2 MIXED HYPERLIPIDEMIA: ICD-10-CM

## 2024-05-02 DIAGNOSIS — Z12.31 VISIT FOR SCREENING MAMMOGRAM: ICD-10-CM

## 2024-05-02 DIAGNOSIS — M81.0 AGE-RELATED OSTEOPOROSIS WITHOUT CURRENT PATHOLOGICAL FRACTURE: Primary | ICD-10-CM

## 2024-05-02 DIAGNOSIS — Z00.00 ANNUAL PHYSICAL EXAM: ICD-10-CM

## 2024-05-02 PROCEDURE — 3725F SCREEN DEPRESSION PERFORMED: CPT | Performed by: FAMILY MEDICINE

## 2024-05-02 PROCEDURE — 99396 PREV VISIT EST AGE 40-64: CPT | Performed by: FAMILY MEDICINE

## 2024-05-02 RX ORDER — ROSUVASTATIN CALCIUM 10 MG/1
10 TABLET, COATED ORAL DAILY
Qty: 90 TABLET | Refills: 3 | Status: CANCELLED | OUTPATIENT
Start: 2024-05-02

## 2024-05-02 RX ORDER — ROSUVASTATIN CALCIUM 20 MG/1
20 TABLET, COATED ORAL DAILY
Qty: 90 TABLET | Refills: 3 | Status: SHIPPED | OUTPATIENT
Start: 2024-05-02

## 2024-05-02 RX ORDER — ROSUVASTATIN CALCIUM 10 MG/1
20 TABLET, COATED ORAL DAILY
Qty: 90 TABLET | Refills: 3 | Status: SHIPPED | OUTPATIENT
Start: 2024-05-02 | End: 2024-05-02 | Stop reason: DRUGHIGH

## 2024-05-02 RX ORDER — CARBAMAZEPINE 400 MG/1
400 TABLET, EXTENDED RELEASE ORAL 2 TIMES DAILY
Qty: 180 TABLET | Refills: 3 | Status: SHIPPED | OUTPATIENT
Start: 2024-05-02 | End: 2024-05-03 | Stop reason: SDUPTHER

## 2024-05-02 NOTE — PROGRESS NOTES
ADULT ANNUAL PHYSICAL  [facility]    NAME: Jessica Robles  AGE: 58 y.o. SEX: female  : 1965     DATE: 2024     Assessment and Plan:     Problem List Items Addressed This Visit    None      Immunizations and preventive care screenings were discussed with patient today. Appropriate education was printed on patient's after visit summary.    Counseling:  Alcohol/drug use: discussed moderation in alcohol intake, the recommendations for healthy alcohol use, and avoidance of illicit drug use.  Dental Health: discussed importance of regular tooth brushing, flossing, and dental visits.  Injury prevention: discussed safety/seat belts, safety helmets, smoke detectors, carbon dioxide detectors, and smoking near bedding or upholstery.  Sexual health: discussed sexually transmitted diseases, partner selection, use of condoms, avoidance of unintended pregnancy, and contraceptive alternatives.  Exercise: the importance of regular exercise/physical activity was discussed. Recommend exercise 3-5 times per week for at least 30 minutes.          No follow-ups on file.     Chief Complaint:     No chief complaint on file.     History of Present Illness:     Adult Annual Physical   Patient here for a comprehensive physical exam. The patient reports no problems.    Diet and Physical Activity  Diet/Nutrition: well balanced diet.   Exercise: no formal exercise.      Depression Screening  PHQ-2/9 Depression Screening    Little interest or pleasure in doing things: 0 - not at all  Feeling down, depressed, or hopeless: 0 - not at all       General Health  Sleep: sleeps well.   Hearing: normal - bilateral.  Vision: no vision problems.   Dental: regular dental visits.       /GYN Health  Follows with gynecology?    Patient is:   Last menstrual period:   Contraceptive method: .    Advanced Care Planning  Do you have an advanced directive?   Do you have a durable  medical power of ?   ACP document given to the patient?      Review of Systems:     Review of Systems   Constitutional:  Negative for chills, fatigue and fever.   HENT:  Negative for congestion, nosebleeds, rhinorrhea, sinus pressure and sore throat.    Eyes:  Negative for discharge and redness.   Respiratory:  Negative for cough and shortness of breath.    Cardiovascular:  Negative for chest pain, palpitations and leg swelling.   Gastrointestinal:  Negative for abdominal pain, blood in stool and nausea.   Endocrine: Negative for cold intolerance, heat intolerance and polyuria.   Genitourinary:  Negative for dysuria and frequency.   Musculoskeletal:  Negative for arthralgias, back pain and myalgias.   Skin:  Negative for rash.   Neurological:  Negative for dizziness, weakness and headaches.   Hematological:  Negative for adenopathy.   Psychiatric/Behavioral:  Negative for behavioral problems and sleep disturbance. The patient is not nervous/anxious.       Past Medical History:     Past Medical History:   Diagnosis Date    Hyperlipidemia     Seizures (HCC)       Past Surgical History:     No past surgical history on file.   Social History:     Social History     Socioeconomic History    Marital status: /Civil Union     Spouse name: Not on file    Number of children: Not on file    Years of education: Not on file    Highest education level: Not on file   Occupational History    Not on file   Tobacco Use    Smoking status: Never    Smokeless tobacco: Never   Vaping Use    Vaping status: Never Used   Substance and Sexual Activity    Alcohol use: Not Currently    Drug use: Never    Sexual activity: Yes     Partners: Male     Birth control/protection: Male Sterilization   Other Topics Concern    Not on file   Social History Narrative    Not on file     Social Determinants of Health     Financial Resource Strain: Not on file   Food Insecurity: Not on file   Transportation Needs: Not on file   Physical  Activity: Not on file   Stress: Not on file   Social Connections: Not on file   Intimate Partner Violence: Not on file   Housing Stability: Not on file      Family History:     Family History   Problem Relation Age of Onset    Dementia Mother     Cancer Father         Lung    Cancer Sister         Breast    Cancer Sister         Skin      Current Medications:     Current Outpatient Medications   Medication Sig Dispense Refill    b complex vitamins capsule Take 1 capsule by mouth daily      CALCIUM-MAGNESIUM-VITAMIN D PO Take by mouth      Omega-3 Fatty Acids (FISH OIL) 1,000 mg Take 1,000 mg by mouth daily      rosuvastatin (CRESTOR) 10 MG tablet Take 1 tablet (10 mg total) by mouth daily 90 tablet 3    TEGretol- MG 12 hr tablet Take 1 tablet (400 mg total) by mouth 2 (two) times a day 180 tablet 3    VITAMIN D PO Take by mouth       No current facility-administered medications for this visit.      Allergies:     No Known Allergies   Physical Exam:     There were no vitals taken for this visit.    Physical Exam  Vitals and nursing note reviewed.   Constitutional:       General: She is not in acute distress.     Appearance: Normal appearance. She is well-developed.   HENT:      Head: Normocephalic and atraumatic.      Right Ear: Tympanic membrane and ear canal normal.      Left Ear: Tympanic membrane, ear canal and external ear normal.      Nose: Nose normal.      Mouth/Throat:      Mouth: Mucous membranes are moist.      Pharynx: Oropharynx is clear.   Eyes:      Conjunctiva/sclera: Conjunctivae normal.      Pupils: Pupils are equal, round, and reactive to light.   Cardiovascular:      Rate and Rhythm: Normal rate and regular rhythm.      Pulses: Normal pulses.      Heart sounds: Normal heart sounds. No murmur heard.  Pulmonary:      Effort: Pulmonary effort is normal. No respiratory distress.      Breath sounds: Normal breath sounds.   Abdominal:      Palpations: Abdomen is soft.      Tenderness: There is no  abdominal tenderness.   Musculoskeletal:         General: No swelling. Normal range of motion.      Cervical back: Neck supple.   Skin:     General: Skin is warm and dry.      Capillary Refill: Capillary refill takes less than 2 seconds.   Neurological:      General: No focal deficit present.      Mental Status: She is alert. Mental status is at baseline.   Psychiatric:         Mood and Affect: Mood normal.         Behavior: Behavior normal.         Thought Content: Thought content normal.         Judgment: Judgment normal.          Nas Che LPN  [facility]

## 2024-05-02 NOTE — ASSESSMENT & PLAN NOTE
Mixed hyperlipidemia stable continuing with Crestor 10 mg daily continue with omega-3 fish oils follow heart healthy diet recheck lab work in 1 year.  Recommend increase Crestor to 20 mg daily

## 2024-05-03 ENCOUNTER — TELEPHONE (OUTPATIENT)
Age: 59
End: 2024-05-03

## 2024-05-03 DIAGNOSIS — R56.9 SEIZURES (HCC): ICD-10-CM

## 2024-05-03 RX ORDER — CARBAMAZEPINE 400 MG/1
400 TABLET, EXTENDED RELEASE ORAL 2 TIMES DAILY
Qty: 180 TABLET | Refills: 3 | Status: SHIPPED | OUTPATIENT
Start: 2024-05-03

## 2024-05-03 NOTE — TELEPHONE ENCOUNTER
Pharmacy [facility] - call in to verify Tegretol Xr 400 mg can the office faxed a copy of the script to [facility] at [phone].

## 2024-06-26 DIAGNOSIS — Z00.6 ENCOUNTER FOR EXAMINATION FOR NORMAL COMPARISON OR CONTROL IN CLINICAL RESEARCH PROGRAM: ICD-10-CM

## 2024-07-01 ENCOUNTER — APPOINTMENT (OUTPATIENT)
Dept: LAB | Facility: CLINIC | Age: 59
End: 2024-07-01

## 2024-07-01 DIAGNOSIS — Z00.6 ENCOUNTER FOR EXAMINATION FOR NORMAL COMPARISON OR CONTROL IN CLINICAL RESEARCH PROGRAM: ICD-10-CM

## 2024-07-01 PROCEDURE — 36415 COLL VENOUS BLD VENIPUNCTURE: CPT

## 2024-07-12 LAB
APOB+LDLR+PCSK9 GENE MUT ANL BLD/T: NOT DETECTED
BRCA1+BRCA2 DEL+DUP + FULL MUT ANL BLD/T: NOT DETECTED
MLH1+MSH2+MSH6+PMS2 GN DEL+DUP+FUL M: NOT DETECTED

## 2024-07-18 ENCOUNTER — HOSPITAL ENCOUNTER (EMERGENCY)
Facility: HOSPITAL | Age: 59
Discharge: HOME | End: 2024-07-19
Attending: EMERGENCY MEDICINE
Payer: COMMERCIAL

## 2024-07-18 VITALS
TEMPERATURE: 97.5 F | RESPIRATION RATE: 16 BRPM | BODY MASS INDEX: 19.95 KG/M2 | DIASTOLIC BLOOD PRESSURE: 82 MMHG | SYSTOLIC BLOOD PRESSURE: 176 MMHG | WEIGHT: 115.3 LBS | OXYGEN SATURATION: 100 % | HEART RATE: 94 BPM

## 2024-07-18 DIAGNOSIS — S42.021A CLOSED DISPLACED FRACTURE OF SHAFT OF RIGHT CLAVICLE, INITIAL ENCOUNTER: Primary | ICD-10-CM

## 2024-07-18 PROCEDURE — 99284 EMERGENCY DEPT VISIT MOD MDM: CPT | Performed by: EMERGENCY MEDICINE

## 2024-07-18 PROCEDURE — 99283 EMERGENCY DEPT VISIT LOW MDM: CPT

## 2024-07-18 RX ORDER — KETOROLAC TROMETHAMINE 30 MG/ML
15 INJECTION, SOLUTION INTRAMUSCULAR; INTRAVENOUS ONCE
Status: COMPLETED | OUTPATIENT
Start: 2024-07-19 | End: 2024-07-19

## 2024-07-19 ENCOUNTER — OFFICE VISIT (OUTPATIENT)
Dept: FAMILY MEDICINE CLINIC | Facility: CLINIC | Age: 59
End: 2024-07-19
Payer: COMMERCIAL

## 2024-07-19 ENCOUNTER — APPOINTMENT (OUTPATIENT)
Dept: LAB | Age: 59
End: 2024-07-19
Payer: COMMERCIAL

## 2024-07-19 ENCOUNTER — OFFICE VISIT (OUTPATIENT)
Dept: OBGYN CLINIC | Facility: CLINIC | Age: 59
End: 2024-07-19
Payer: COMMERCIAL

## 2024-07-19 ENCOUNTER — APPOINTMENT (EMERGENCY)
Dept: RADIOLOGY | Facility: HOSPITAL | Age: 59
End: 2024-07-19
Payer: COMMERCIAL

## 2024-07-19 VITALS
WEIGHT: 114.6 LBS | DIASTOLIC BLOOD PRESSURE: 60 MMHG | RESPIRATION RATE: 18 BRPM | OXYGEN SATURATION: 98 % | TEMPERATURE: 98.9 F | HEART RATE: 64 BPM | BODY MASS INDEX: 19.56 KG/M2 | HEIGHT: 64 IN | SYSTOLIC BLOOD PRESSURE: 110 MMHG

## 2024-07-19 VITALS
DIASTOLIC BLOOD PRESSURE: 88 MMHG | HEART RATE: 64 BPM | SYSTOLIC BLOOD PRESSURE: 148 MMHG | HEIGHT: 64 IN | WEIGHT: 115 LBS | BODY MASS INDEX: 19.63 KG/M2

## 2024-07-19 DIAGNOSIS — Z01.818 PRE-OP EXAMINATION: Primary | ICD-10-CM

## 2024-07-19 DIAGNOSIS — M81.0 AGE-RELATED OSTEOPOROSIS WITHOUT CURRENT PATHOLOGICAL FRACTURE: ICD-10-CM

## 2024-07-19 DIAGNOSIS — R56.9 SEIZURES (HCC): ICD-10-CM

## 2024-07-19 DIAGNOSIS — Z01.818 PRE-OPERATIVE CLEARANCE: ICD-10-CM

## 2024-07-19 DIAGNOSIS — M25.552 BILATERAL HIP PAIN: ICD-10-CM

## 2024-07-19 DIAGNOSIS — S42.021A CLOSED DISPLACED FRACTURE OF SHAFT OF RIGHT CLAVICLE, INITIAL ENCOUNTER: Primary | ICD-10-CM

## 2024-07-19 DIAGNOSIS — M25.551 BILATERAL HIP PAIN: ICD-10-CM

## 2024-07-19 DIAGNOSIS — S42.021A CLOSED DISPLACED FRACTURE OF SHAFT OF RIGHT CLAVICLE, INITIAL ENCOUNTER: ICD-10-CM

## 2024-07-19 DIAGNOSIS — E78.2 MIXED HYPERLIPIDEMIA: ICD-10-CM

## 2024-07-19 PROBLEM — S42.021K CLOSED DISPLACED FRACTURE OF SHAFT OF RIGHT CLAVICLE WITH NONUNION: Status: ACTIVE | Noted: 2024-07-19

## 2024-07-19 LAB
ANION GAP SERPL CALCULATED.3IONS-SCNC: 11 MMOL/L (ref 4–13)
BASOPHILS # BLD AUTO: 0.02 THOUSANDS/ÂΜL (ref 0–0.1)
BASOPHILS NFR BLD AUTO: 0 % (ref 0–1)
BUN SERPL-MCNC: 19 MG/DL (ref 5–25)
CALCIUM SERPL-MCNC: 9.7 MG/DL (ref 8.4–10.2)
CHLORIDE SERPL-SCNC: 96 MMOL/L (ref 96–108)
CO2 SERPL-SCNC: 30 MMOL/L (ref 21–32)
CREAT SERPL-MCNC: 0.7 MG/DL (ref 0.6–1.3)
EOSINOPHIL # BLD AUTO: 0.03 THOUSAND/ÂΜL (ref 0–0.61)
EOSINOPHIL NFR BLD AUTO: 1 % (ref 0–6)
ERYTHROCYTE [DISTWIDTH] IN BLOOD BY AUTOMATED COUNT: 12.2 % (ref 11.6–15.1)
GFR SERPL CREATININE-BSD FRML MDRD: 94 ML/MIN/1.73SQ M
GLUCOSE SERPL-MCNC: 66 MG/DL (ref 65–140)
HCT VFR BLD AUTO: 41.7 % (ref 34.8–46.1)
HGB BLD-MCNC: 14.2 G/DL (ref 11.5–15.4)
IMM GRANULOCYTES # BLD AUTO: 0.02 THOUSAND/UL (ref 0–0.2)
IMM GRANULOCYTES NFR BLD AUTO: 0 % (ref 0–2)
LYMPHOCYTES # BLD AUTO: 1.41 THOUSANDS/ÂΜL (ref 0.6–4.47)
LYMPHOCYTES NFR BLD AUTO: 23 % (ref 14–44)
MCH RBC QN AUTO: 30.9 PG (ref 26.8–34.3)
MCHC RBC AUTO-ENTMCNC: 34.1 G/DL (ref 31.4–37.4)
MCV RBC AUTO: 91 FL (ref 82–98)
MONOCYTES # BLD AUTO: 0.4 THOUSAND/ÂΜL (ref 0.17–1.22)
MONOCYTES NFR BLD AUTO: 6 % (ref 4–12)
NEUTROPHILS # BLD AUTO: 4.4 THOUSANDS/ÂΜL (ref 1.85–7.62)
NEUTS SEG NFR BLD AUTO: 70 % (ref 43–75)
NRBC BLD AUTO-RTO: 0 /100 WBCS
PLATELET # BLD AUTO: 172 THOUSANDS/UL (ref 149–390)
PMV BLD AUTO: 10.5 FL (ref 8.9–12.7)
POTASSIUM SERPL-SCNC: 3.7 MMOL/L (ref 3.5–5.3)
RBC # BLD AUTO: 4.6 MILLION/UL (ref 3.81–5.12)
SODIUM SERPL-SCNC: 137 MMOL/L (ref 135–147)
WBC # BLD AUTO: 6.28 THOUSAND/UL (ref 4.31–10.16)

## 2024-07-19 PROCEDURE — 73080 X-RAY EXAM OF ELBOW: CPT

## 2024-07-19 PROCEDURE — 99204 OFFICE O/P NEW MOD 45 MIN: CPT | Performed by: ORTHOPAEDIC SURGERY

## 2024-07-19 PROCEDURE — 80048 BASIC METABOLIC PNL TOTAL CA: CPT

## 2024-07-19 PROCEDURE — 93000 ELECTROCARDIOGRAM COMPLETE: CPT | Performed by: FAMILY MEDICINE

## 2024-07-19 PROCEDURE — 36415 COLL VENOUS BLD VENIPUNCTURE: CPT

## 2024-07-19 PROCEDURE — 99214 OFFICE O/P EST MOD 30 MIN: CPT | Performed by: FAMILY MEDICINE

## 2024-07-19 PROCEDURE — 96374 THER/PROPH/DIAG INJ IV PUSH: CPT

## 2024-07-19 PROCEDURE — 73000 X-RAY EXAM OF COLLAR BONE: CPT

## 2024-07-19 PROCEDURE — 73030 X-RAY EXAM OF SHOULDER: CPT

## 2024-07-19 PROCEDURE — 85025 COMPLETE CBC W/AUTO DIFF WBC: CPT

## 2024-07-19 RX ORDER — CEFAZOLIN SODIUM 2 G/50ML
2000 SOLUTION INTRAVENOUS ONCE
Status: CANCELLED | OUTPATIENT
Start: 2024-07-26 | End: 2024-07-19

## 2024-07-19 RX ORDER — OXYCODONE HYDROCHLORIDE AND ACETAMINOPHEN 5; 325 MG/1; MG/1
1 TABLET ORAL EVERY 6 HOURS PRN
Qty: 20 TABLET | Refills: 0 | Status: SHIPPED | OUTPATIENT
Start: 2024-07-19 | End: 2024-07-26

## 2024-07-19 RX ORDER — OXYCODONE HYDROCHLORIDE 5 MG/1
5 TABLET ORAL ONCE
Status: COMPLETED | OUTPATIENT
Start: 2024-07-19 | End: 2024-07-19

## 2024-07-19 RX ORDER — CHLORHEXIDINE GLUCONATE ORAL RINSE 1.2 MG/ML
15 SOLUTION DENTAL ONCE
Status: CANCELLED | OUTPATIENT
Start: 2024-07-26 | End: 2024-07-19

## 2024-07-19 RX ADMIN — KETOROLAC TROMETHAMINE 15 MG: 30 INJECTION, SOLUTION INTRAMUSCULAR; INTRAVENOUS at 00:01

## 2024-07-19 RX ADMIN — OXYCODONE HYDROCHLORIDE 5 MG: 5 TABLET ORAL at 01:14

## 2024-07-19 NOTE — PROGRESS NOTES
FAMILY MEDICINE PRE-OPERATIVE EVALUATION  [facility] - [facility]    NAME: Jessica Robles  AGE: 58 y.o. SEX: female  : 1965     DATE: 2024     Family Medicine Pre-Operative Evaluation:     Chief Complaint: Pre-operative Evaluation     Surgery: open reduction internal fixation right clavicle  Anticipated Date of Surgery: 2024  Referring Provider:           History of Present Illness:     Jessica Robles is a 58 y.o. female who presents to the office today for a preoperative consultation at the request of surgeon, Dr Loco, who plans on performing ORIF right clavicle on 2024. Planned anesthesia is general. Patient has a bleeding risk of: no recent abnormal bleeding. Patient does not have objections to receiving blood products if needed. Current anti-platelet/anti-coagulation medications that the patient is prescribed includes: NONE.        Assessment of Chronic Conditions:   Reviewed all         Review of Systems:     Review of Systems   Constitutional:  Negative for chills, fatigue and fever.   HENT:  Negative for congestion, nosebleeds, rhinorrhea, sinus pressure and sore throat.    Eyes:  Negative for discharge and redness.   Respiratory:  Negative for cough and shortness of breath.    Cardiovascular:  Negative for chest pain, palpitations and leg swelling.   Gastrointestinal:  Negative for abdominal pain, blood in stool and nausea.   Endocrine: Negative for cold intolerance, heat intolerance and polyuria.   Genitourinary:  Negative for dysuria and frequency.   Musculoskeletal:  Negative for arthralgias, back pain and myalgias.   Skin:  Negative for rash.   Neurological:  Negative for dizziness, weakness and headaches.   Hematological:  Negative for adenopathy.   Psychiatric/Behavioral:  Negative for behavioral problems and sleep disturbance. The patient is not nervous/anxious.         Problem List:     Patient Active Problem List    Diagnosis    Mixed hyperlipidemia    Seizures (HCC)    Annual physical exam    Age-related osteoporosis without current pathological fracture    Bilateral hip pain    Pre-op examination    Closed displaced fracture of shaft of right clavicle        Allergies:     No Known Allergies     Current Medications:       Current Outpatient Medications:     b complex vitamins capsule, Take 1 capsule by mouth daily, Disp: , Rfl:     CALCIUM-MAGNESIUM-VITAMIN D PO, Take by mouth, Disp: , Rfl:     oxyCODONE-acetaminophen (PERCOCET) 5-325 mg per tablet, Take 1 tablet by mouth every 6 (six) hours as needed for moderate pain for up to 20 days Max Daily Amount: 4 tablets, Disp: 20 tablet, Rfl: 0    rosuvastatin (CRESTOR) 20 MG tablet, Take 1 tablet (20 mg total) by mouth daily, Disp: 90 tablet, Rfl: 3    TEGretol- MG 12 hr tablet, Take 1 tablet (400 mg total) by mouth 2 (two) times a day, Disp: 180 tablet, Rfl: 3    VITAMIN D PO, Take by mouth, Disp: , Rfl:   No current facility-administered medications for this visit.     Past History:     Past Medical History:   Diagnosis Date    Hyperlipidemia     Seizures (HCC)         History reviewed. No pertinent surgical history.     Family History   Problem Relation Age of Onset    Dementia Mother     Cancer Father         Lung    Cancer Sister         Breast    Cancer Sister         Skin        Social History     Socioeconomic History    Marital status: /Civil Union     Spouse name: Not on file    Number of children: Not on file    Years of education: Not on file    Highest education level: Not on file   Occupational History    Not on file   Tobacco Use    Smoking status: Never    Smokeless tobacco: Never   Vaping Use    Vaping status: Never Used   Substance and Sexual Activity    Alcohol use: Not Currently    Drug use: Never    Sexual activity: Yes     Partners: Male     Birth control/protection: Male Sterilization   Other Topics Concern    Not on file   Social History  "Narrative    Not on file     Social Determinants of Health     Financial Resource Strain: Not on file   Food Insecurity: Not on file   Transportation Needs: Not on file   Physical Activity: Not on file   Stress: Not on file   Social Connections: Unknown (6/18/2024)    Received from [facility]     How often do you feel lonely or isolated from those around you? (Adult - for ages 18 years and over): Not on file   Intimate Partner Violence: Not on file   Housing Stability: Not on file        Physical Exam:      /60 (BP Location: Left arm, Patient Position: Sitting, Cuff Size: Standard)   Pulse 64   Temp 98.9 °F (37.2 °C) (Tympanic)   Resp 18   Ht 5' 3.75\" (1.619 m)   Wt 52 kg (114 lb 9.6 oz)   SpO2 98%   BMI 19.83 kg/m²     Physical Exam  Vitals and nursing note reviewed.   Constitutional:       General: She is not in acute distress.     Appearance: She is well-developed.   HENT:      Head: Normocephalic and atraumatic.      Right Ear: External ear normal.      Left Ear: External ear normal.      Nose: Nose normal.      Mouth/Throat:      Mouth: Mucous membranes are moist.      Pharynx: Oropharynx is clear. No oropharyngeal exudate.   Eyes:      General: No scleral icterus.        Right eye: No discharge.         Left eye: No discharge.      Conjunctiva/sclera: Conjunctivae normal.      Pupils: Pupils are equal, round, and reactive to light.   Neck:      Thyroid: No thyromegaly.      Vascular: No JVD.   Cardiovascular:      Rate and Rhythm: Normal rate and regular rhythm.      Heart sounds: Normal heart sounds. No murmur heard.  Pulmonary:      Effort: Pulmonary effort is normal.      Breath sounds: No wheezing or rales.   Chest:      Chest wall: No tenderness.   Abdominal:      General: Bowel sounds are normal. There is no distension.      Palpations: Abdomen is soft. There is no mass.      Tenderness: There is no abdominal tenderness.   Musculoskeletal:         General: Tenderness, " deformity and signs of injury present. Normal range of motion.      Cervical back: Normal range of motion.      Comments: Right clavicle   Lymphadenopathy:      Cervical: No cervical adenopathy.   Skin:     General: Skin is warm and dry.      Findings: No rash.   Neurological:      General: No focal deficit present.      Mental Status: She is alert and oriented to person, place, and time.      Cranial Nerves: No cranial nerve deficit.      Coordination: Coordination normal.      Deep Tendon Reflexes: Reflexes are normal and symmetric. Reflexes normal.   Psychiatric:         Mood and Affect: Mood normal.         Behavior: Behavior normal.         Thought Content: Thought content normal.         Judgment: Judgment normal.        I have spent a total time of 50 minutes in caring for this patient on the day of the visit/encounter including Diagnostic results, Prognosis, Risks and benefits of tx options, Instructions for management, Patient and family education, Importance of tx compliance, Risk factor reductions, Impressions, Counseling / Coordination of care, Documenting in the medical record, Reviewing / ordering tests, medicine, procedures  , Obtaining or reviewing history  , and Communicating with other healthcare professionals .     Data:     Pre-operative work-up    Laboratory Results: I have personally reviewed the pertinent laboratory results/reports     EKG: I have personally reviewed pertinent reports.   NSR    Chest x-ray: I have personally reviewed pertinent reports.      Previous cardiopulmonary studies within the past year:         Assessment:     1. Pre-op examination  POCT ECG      2. Closed displaced fracture of shaft of right clavicle, initial encounter  Ambulatory referral to Family Practice    POCT ECG      3. Seizures (HCC)        4. Mixed hyperlipidemia        5. Bilateral hip pain        6. Age-related osteoporosis without current pathological fracture             Plan:     58 y.o. female with  planned surgery: ORIF right clavicle.      Cardiac Risk Estimation: per the Revised Cardiac Risk Index (Circ. 100:1043, 1999), the patient's risk factors for cardiac complications include NONE, putting her in: RCI RISK CLASS I (0 risk factors, risk of major cardiac compl. appr. 0.5%).    1. Further preoperative workup as follows:   - None; no further preoperative work-up is required    2. Medication Management/Recommendations:   - None, continue medication regimen including morning of surgery, with sip of water    3. Prophylaxis for cardiac events with perioperative beta-blockers: not indicated.    4. Patient requires further consultation with: None    Clearance  Patient is CLEARED for surgery without any additional cardiac testing.     Raymundo Jones DO  [address] [address]  Phone#  [phone]  Fax#  [phone]

## 2024-07-19 NOTE — ED PROVIDER NOTES
History  Chief Complaint   Patient presents with    Clavicle Injury     Pt reports tripped over cat approx 40 min ago and fell. Pt denies head strike. Pt c/o R clavicle pain. Denies blood thinners.        History provided by:  Patient  Shoulder Injury  Location:  Clavicle and shoulder  Clavicle location:  R clavicle  Shoulder location:  R shoulder  Injury: yes    Time since incident:  1 hour  Mechanism of injury: fall    Fall:     Fall occurred:  Tripped    Impact surface:  Hard floor    Entrapped after fall: no    Pain details:     Quality:  Aching    Radiates to:  R shoulder    Severity:  Moderate    Onset quality:  Sudden    Duration:  1 hour    Timing:  Constant    Progression:  Unchanged  Dislocation: no    Prior injury to area:  No  Relieved by:  Nothing  Worsened by:  Movement  Ineffective treatments:  None tried  Associated symptoms: decreased range of motion and swelling    Associated symptoms: no back pain, no numbness and no stiffness        Prior to Admission Medications   Prescriptions Last Dose Informant Patient Reported? Taking?   CALCIUM-MAGNESIUM-VITAMIN D PO  Self Yes No   Sig: Take by mouth   Omega-3 Fatty Acids (FISH OIL) 1,000 mg  Self Yes No   Sig: Take 1,000 mg by mouth daily   Patient not taking: Reported on 5/2/2024   TEGretol- MG 12 hr tablet   No No   Sig: Take 1 tablet (400 mg total) by mouth 2 (two) times a day   VITAMIN D PO  Self Yes No   Sig: Take by mouth   b complex vitamins capsule  Self Yes No   Sig: Take 1 capsule by mouth daily   rosuvastatin (CRESTOR) 20 MG tablet   No No   Sig: Take 1 tablet (20 mg total) by mouth daily      Facility-Administered Medications: None       Past Medical History:   Diagnosis Date    Hyperlipidemia     Seizures (HCC)        History reviewed. No pertinent surgical history.    Family History   Problem Relation Age of Onset    Dementia Mother     Cancer Father         Lung    Cancer Sister         Breast    Cancer Sister         Skin     I have  reviewed and agree with the history as documented.    E-Cigarette/Vaping    E-Cigarette Use Never User      E-Cigarette/Vaping Substances    Nicotine No     THC No     CBD No     Flavoring No     Other No     Unknown No      Social History     Tobacco Use    Smoking status: Never    Smokeless tobacco: Never   Vaping Use    Vaping status: Never Used   Substance Use Topics    Alcohol use: Not Currently    Drug use: Never       Review of Systems   Musculoskeletal:  Negative for back pain and stiffness.   All other systems reviewed and are negative.      Physical Exam  Physical Exam  Vitals reviewed.   HENT:      Head: Normocephalic and atraumatic.   Musculoskeletal:      Right shoulder: Swelling present. Deformity: to mid-clavicle.Decreased range of motion.      Right elbow: Normal.      Left elbow: Tenderness present.        Arms:    Neurological:      Mental Status: She is alert.         Vital Signs  ED Triage Vitals [07/18/24 2315]   Temperature Pulse Respirations Blood Pressure SpO2   97.5 °F (36.4 °C) 94 16 (!) 176/82 100 %      Temp Source Heart Rate Source Patient Position - Orthostatic VS BP Location FiO2 (%)   Temporal Monitor Sitting Left arm --      Pain Score       --           Vitals:    07/18/24 2315   BP: (!) 176/82   Pulse: 94   Patient Position - Orthostatic VS: Sitting         Visual Acuity      ED Medications  Medications   ketorolac (TORADOL) injection 15 mg (15 mg Intravenous Given 7/19/24 0001)   oxyCODONE (ROXICODONE) IR tablet 5 mg (5 mg Oral Given 7/19/24 0114)       Diagnostic Studies  Results Reviewed       None                   XR clavicle RIGHT   ED Interpretation by Junie Kulkarni MD (07/19 0042)   Right clavicle fracture      Final Result by Sunshine Selby MD (07/19 0820)   Displaced fracture of the mid right clavicle with overlapping fracture fragments         Computerized Assisted Algorithm (CAA) may have been used to analyze all applicable images.         Workstation  performed: [id number]         XR shoulder 2+ views RIGHT   ED Interpretation by Junie Kulkarni MD (07/19 0042)   NAD      Final Result by Sunshine Selby MD (07/19 0823)   Displaced fracture of the mid right clavicle   No fracture seen in the proximal humerus      Computerized Assisted Algorithm (CAA) may have been used to analyze all applicable images.         Workstation performed: [id number]         XR elbow 3+ views LEFT   ED Interpretation by Junie Kulkarni MD (07/19 0042)   NAD      Final Result by Sunshine Selby MD (07/19 0825)      No acute osseous abnormality.         Computerized Assisted Algorithm (CAA) may have been used to analyze all applicable images.         Workstation performed: [id number]                    Procedures  Procedures         ED Course                                 SBIRT 20yo+      Flowsheet Row Most Recent Value   Initial Alcohol Screen: US AUDIT-C     1. How often do you have a drink containing alcohol? 0 Filed at: 07/19/2024 0002   2. How many drinks containing alcohol do you have on a typical day you are drinking?  0 Filed at: 07/19/2024 0002   3b. FEMALE Any Age, or MALE 65+: How often do you have 4 or more drinks on one occassion? 0 Filed at: 07/19/2024 0002   Audit-C Score 0 Filed at: 07/19/2024 0002   MARIO: How many times in the past year have you...    Used an illegal drug or used a prescription medication for non-medical reasons? Never Filed at: 07/19/2024 0002                      Medical Decision Making  57yo female is coming in with right shoulder/clavicle pain after she accidentally tripped over her cat and fell over and landed on right shoulder. Pt with obvious swelling and tenderness mid clavicle. Some shoulder tenderness and hit left elbow. No head injury or LOC. NO CP/abd pain. WIll get xrays and tx pain.    Amount and/or Complexity of Data Reviewed  Radiology: ordered and independent interpretation performed.    Risk  Prescription drug  management.  Risk Details: Showed pt picutres of her clavicle and fracture with displacment. Pt given pain medicine and sling. D/w her that this clavicle fracture may need surgery given displacment and pt needs stat ortho referral and eval.                  Disposition  Final diagnoses:   Closed displaced fracture of shaft of right clavicle, initial encounter     Time reflects when diagnosis was documented in both MDM as applicable and the Disposition within this note       Time User Action Codes Description Comment    7/19/2024 12:59 AM Junie Kulkarni Add [S42.021A] Closed displaced fracture of shaft of right clavicle, initial encounter           ED Disposition       ED Disposition   Discharge    Condition   Stable    Date/Time   Fri Jul 19, 2024 0043    Comment   Jessica Robles discharge to home/self care.                   Follow-up Information       Follow up With Specialties Details Why Contact Info Additional Information    Raymundo Jones, DO Family Medicine Go to  If symptoms worsen [address] [id number] [phone]       [facility]opedic [facility] Orthopedic Surgery Call in 1 day  [address] [facility] [address]  [facility] [address] [phone] [facility] Orthopedic Care [facility], [address] [place] [address] [address], [place], [place], [address] [phone]            Discharge Medication List as of 7/19/2024  1:00 AM        CONTINUE these medications which have NOT CHANGED    Details   b complex vitamins capsule Take 1 capsule by mouth daily, Historical Med      CALCIUM-MAGNESIUM-VITAMIN D PO Take by mouth, Historical Med      Omega-3 Fatty Acids (FISH OIL) 1,000 mg Take 1,000 mg by mouth daily, Historical Med      rosuvastatin (CRESTOR) 20 MG tablet Take 1 tablet (20 mg total) by mouth daily, Starting Thu 5/2/2024, Normal      TEGretol- MG 12 hr tablet Take 1 tablet (400 mg total) by mouth 2 (two) times a day, Starting  Fri 5/3/2024, Normal      VITAMIN D PO Take by mouth, Historical Med                 PDMP Review       None            ED Provider  Electronically Signed by             Junie Kulkarni MD  07/19/24 9825

## 2024-07-19 NOTE — LETTER
2024     Boris Loco MD  [address] [facility] [address]    Patient: Jessica Robles   YOB: 1965   Date of Visit: 2024       Dear Dr. Loco:    Thank you for referring Jessica Robles to me for evaluation. Below are my notes for this consultation.    If you have questions, please do not hesitate to call me. I look forward to following your patient along with you.         Sincerely,        Raymundo Jones DO        CC: No Recipients    Raymundo Jones DO  2024  2:24 PM  Incomplete  FAMILY MEDICINE PRE-OPERATIVE EVALUATION  [facility]    NAME: Jessica Robles  AGE: 58 y.o. SEX: female  : 1965     DATE: 2024     Family Medicine Pre-Operative Evaluation:     Chief Complaint: Pre-operative Evaluation     Surgery: open reduction internal fixation right clavicle  Anticipated Date of Surgery: 2024  Referring Provider:           History of Present Illness:     Jessica Robles is a 58 y.o. female who presents to the office today for a preoperative consultation at the request of surgeon, Dr Loco, who plans on performing ORIF right clavicle on 2024. Planned anesthesia is general. Patient has a bleeding risk of: no recent abnormal bleeding. Patient does not have objections to receiving blood products if needed. Current anti-platelet/anti-coagulation medications that the patient is prescribed includes: NONE.        Assessment of Chronic Conditions:   Reviewed all         Review of Systems:     Review of Systems   Constitutional:  Negative for chills, fatigue and fever.   HENT:  Negative for congestion, nosebleeds, rhinorrhea, sinus pressure and sore throat.    Eyes:  Negative for discharge and redness.   Respiratory:  Negative for cough and shortness of breath.    Cardiovascular:  Negative for chest pain, palpitations and leg swelling.   Gastrointestinal:  Negative for abdominal  pain, blood in stool and nausea.   Endocrine: Negative for cold intolerance, heat intolerance and polyuria.   Genitourinary:  Negative for dysuria and frequency.   Musculoskeletal:  Negative for arthralgias, back pain and myalgias.   Skin:  Negative for rash.   Neurological:  Negative for dizziness, weakness and headaches.   Hematological:  Negative for adenopathy.   Psychiatric/Behavioral:  Negative for behavioral problems and sleep disturbance. The patient is not nervous/anxious.         Problem List:     Patient Active Problem List   Diagnosis   • Mixed hyperlipidemia   • Seizures (HCC)   • Annual physical exam   • Age-related osteoporosis without current pathological fracture   • Bilateral hip pain   • Pre-op examination   • Closed displaced fracture of shaft of right clavicle        Allergies:     No Known Allergies     Current Medications:       Current Outpatient Medications:   •  b complex vitamins capsule, Take 1 capsule by mouth daily, Disp: , Rfl:   •  CALCIUM-MAGNESIUM-VITAMIN D PO, Take by mouth, Disp: , Rfl:   •  oxyCODONE-acetaminophen (PERCOCET) 5-325 mg per tablet, Take 1 tablet by mouth every 6 (six) hours as needed for moderate pain for up to 20 days Max Daily Amount: 4 tablets, Disp: 20 tablet, Rfl: 0  •  rosuvastatin (CRESTOR) 20 MG tablet, Take 1 tablet (20 mg total) by mouth daily, Disp: 90 tablet, Rfl: 3  •  TEGretol- MG 12 hr tablet, Take 1 tablet (400 mg total) by mouth 2 (two) times a day, Disp: 180 tablet, Rfl: 3  •  VITAMIN D PO, Take by mouth, Disp: , Rfl:   No current facility-administered medications for this visit.     Past History:     Past Medical History:   Diagnosis Date   • Hyperlipidemia    • Seizures (HCC)         History reviewed. No pertinent surgical history.     Family History   Problem Relation Age of Onset   • Dementia Mother    • Cancer Father         Lung   • Cancer Sister         Breast   • Cancer Sister         Skin        Social History     Socioeconomic  "History   • Marital status: /Civil Union     Spouse name: Not on file   • Number of children: Not on file   • Years of education: Not on file   • Highest education level: Not on file   Occupational History   • Not on file   Tobacco Use   • Smoking status: Never   • Smokeless tobacco: Never   Vaping Use   • Vaping status: Never Used   Substance and Sexual Activity   • Alcohol use: Not Currently   • Drug use: Never   • Sexual activity: Yes     Partners: Male     Birth control/protection: Male Sterilization   Other Topics Concern   • Not on file   Social History Narrative   • Not on file     Social Determinants of Health     Financial Resource Strain: Not on file   Food Insecurity: Not on file   Transportation Needs: Not on file   Physical Activity: Not on file   Stress: Not on file   Social Connections: Unknown (6/18/2024)    Received from [facility]    • How often do you feel lonely or isolated from those around you? (Adult - for ages 18 years and over): Not on file   Intimate Partner Violence: Not on file   Housing Stability: Not on file        Physical Exam:      /60 (BP Location: Left arm, Patient Position: Sitting, Cuff Size: Standard)   Pulse 64   Temp 98.9 °F (37.2 °C) (Tympanic)   Resp 18   Ht 5' 3.75\" (1.619 m)   Wt 52 kg (114 lb 9.6 oz)   SpO2 98%   BMI 19.83 kg/m²     Physical Exam  Vitals and nursing note reviewed.   Constitutional:       General: She is not in acute distress.     Appearance: She is well-developed.   HENT:      Head: Normocephalic and atraumatic.      Right Ear: External ear normal.      Left Ear: External ear normal.      Nose: Nose normal.      Mouth/Throat:      Mouth: Mucous membranes are moist.      Pharynx: Oropharynx is clear. No oropharyngeal exudate.   Eyes:      General: No scleral icterus.        Right eye: No discharge.         Left eye: No discharge.      Conjunctiva/sclera: Conjunctivae normal.      Pupils: Pupils are equal, round, and " reactive to light.   Neck:      Thyroid: No thyromegaly.      Vascular: No JVD.   Cardiovascular:      Rate and Rhythm: Normal rate and regular rhythm.      Heart sounds: Normal heart sounds. No murmur heard.  Pulmonary:      Effort: Pulmonary effort is normal.      Breath sounds: No wheezing or rales.   Chest:      Chest wall: No tenderness.   Abdominal:      General: Bowel sounds are normal. There is no distension.      Palpations: Abdomen is soft. There is no mass.      Tenderness: There is no abdominal tenderness.   Musculoskeletal:         General: Tenderness, deformity and signs of injury present. Normal range of motion.      Cervical back: Normal range of motion.      Comments: Right clavicle   Lymphadenopathy:      Cervical: No cervical adenopathy.   Skin:     General: Skin is warm and dry.      Findings: No rash.   Neurological:      General: No focal deficit present.      Mental Status: She is alert and oriented to person, place, and time.      Cranial Nerves: No cranial nerve deficit.      Coordination: Coordination normal.      Deep Tendon Reflexes: Reflexes are normal and symmetric. Reflexes normal.   Psychiatric:         Mood and Affect: Mood normal.         Behavior: Behavior normal.         Thought Content: Thought content normal.         Judgment: Judgment normal.           Data:     Pre-operative work-up    Laboratory Results: I have personally reviewed the pertinent laboratory results/reports     EKG: I have personally reviewed pertinent reports.      Chest x-ray: I have personally reviewed pertinent reports.      Previous cardiopulmonary studies within the past year:         Assessment:     1. Pre-op examination  POCT ECG      2. Closed displaced fracture of shaft of right clavicle, initial encounter  Ambulatory referral to Family Practice    POCT ECG      3. Seizures (HCC)        4. Mixed hyperlipidemia        5. Bilateral hip pain        6. Age-related osteoporosis without current pathological  fracture             Plan:     58 y.o. female with planned surgery: ORIF right clavicle.      Cardiac Risk Estimation: per the Revised Cardiac Risk Index (Circ. 100:1043, 1999), the patient's risk factors for cardiac complications include NONE, putting her in: RCI RISK CLASS I (0 risk factors, risk of major cardiac compl. appr. 0.5%).    1. Further preoperative workup as follows:   - None; no further preoperative work-up is required    2. Medication Management/Recommendations:   - None, continue medication regimen including morning of surgery, with sip of water    3. Prophylaxis for cardiac events with perioperative beta-blockers: not indicated.    4. Patient requires further consultation with: None    Clearance  Patient is CLEARED for surgery without any additional cardiac testing.     Raymundo Jones DO  [address] [place] [address]  Phone#  [phone]  Fax#  [phone]

## 2024-07-19 NOTE — PROGRESS NOTES
CHIEF COMPLAIN/REASON FOR VISIT  Chief Complaint   Patient presents with    Right Shoulder - Pain       HISTORY OF PRESENT ILLNESS  Jessica Robles is a RHD 58 y.o. female who presents for evaluation of their left shoulder. Patient said yesterday she fell over her cat and landed on her left shoulder. She said she went to the ER and was found to have a clavicle fracture and placed in a sling. She reports pain in the left mid-clavicle. She has remained in the sling. She denies numbness/tingling down the arm.    REVIEW OF SYSTEMS  Review of systems was performed and, woutside that mentioned in the HPI, it was negative for symptomology related to the integumentary, hematologic, immunologic, allergic, neurologic, cardiovascular, respiratory, GI or  systems.     MEDICAL HISTORY  Patient Active Problem List   Diagnosis    Mixed hyperlipidemia    Seizures (HCC)    Annual physical exam    Age-related osteoporosis without current pathological fracture    Bilateral hip pain       SURGICAL HISTORY  History reviewed. No pertinent surgical history.    CURRENT MEDICATIONS    Current Outpatient Medications:     b complex vitamins capsule, Take 1 capsule by mouth daily, Disp: , Rfl:     CALCIUM-MAGNESIUM-VITAMIN D PO, Take by mouth, Disp: , Rfl:     oxyCODONE-acetaminophen (PERCOCET) 5-325 mg per tablet, Take 1 tablet by mouth every 6 (six) hours as needed for moderate pain for up to 20 days Max Daily Amount: 4 tablets, Disp: 20 tablet, Rfl: 0    rosuvastatin (CRESTOR) 20 MG tablet, Take 1 tablet (20 mg total) by mouth daily, Disp: 90 tablet, Rfl: 3    TEGretol- MG 12 hr tablet, Take 1 tablet (400 mg total) by mouth 2 (two) times a day, Disp: 180 tablet, Rfl: 3    VITAMIN D PO, Take by mouth, Disp: , Rfl:     Omega-3 Fatty Acids (FISH OIL) 1,000 mg, Take 1,000 mg by mouth daily (Patient not taking: Reported on 5/2/2024), Disp: , Rfl:   No current facility-administered medications for this visit.    SOCIAL HISTORY  Social  "History     Socioeconomic History    Marital status: /Civil Union     Spouse name: Not on file    Number of children: Not on file    Years of education: Not on file    Highest education level: Not on file   Occupational History    Not on file   Tobacco Use    Smoking status: Never    Smokeless tobacco: Never   Vaping Use    Vaping status: Never Used   Substance and Sexual Activity    Alcohol use: Not Currently    Drug use: Never    Sexual activity: Yes     Partners: Male     Birth control/protection: Male Sterilization   Other Topics Concern    Not on file   Social History Narrative    Not on file     Social Determinants of Health     Financial Resource Strain: Not on file   Food Insecurity: Not on file   Transportation Needs: Not on file   Physical Activity: Not on file   Stress: Not on file   Social Connections: Unknown (6/18/2024)    Received from [facility]     How often do you feel lonely or isolated from those around you? (Adult - for ages 18 years and over): Not on file   Intimate Partner Violence: Not on file   Housing Stability: Not on file       Objective     VITAL SIGNS  /88   Pulse 64   Ht 5' 3.75\" (1.619 m)   Wt 52.2 kg (115 lb)   BMI 19.89 kg/m²      PHYSICAL EXAM  General:   Well-appearing  No acute distress  Appears stated age    Left Shoulder  Inspection reveals moderate ecchymosis around mid shaft clavicle but no obvious bony prominence  TTP around mid-clavicle region  Skin is intact with no erythema, warmth or drainage  Motor strength intact distally  Sensation to light touch is normal in the axillary, radial, ulnar, and median nerve distributions.  Fingers warm and perfused    RADIOGRAPHIC EXAMINATION/DIAGNOSTICS:  Left clavicle x-ray shows displaced, shortened, and comminuted mid shaft clavicle fracture    ASSESSMENT/PLAN:  Left mid shaft clavicle fracture    We had a lengthy discussion regarding the diagnosis, natural history, and treatment options. We " discussed both non-operative and operative treatment. She has continued to experience significant symptoms and dysfunction and is ready to proceed with surgery. I certainly understand and am in agreement. Accordingly, we will plan to proceed with ORIF clavicle.    We discussed the anticipated pre-, intra-, and postoperative course in great detail. Specifically, we discussed the recovery and rehabilitation protocol and time lines. She would like to move forward with booking surgery. All of her questions were answered, and she voiced understanding of the plan.     Scribe Attestation      I,:  Mohit Brooks PA-C am acting as a scribe while in the presence of the attending physician.:       I,:  Boris Loco MD personally performed the services described in this documentation    as scribed in my presence.:

## 2024-07-23 RX ORDER — IBUPROFEN 200 MG
TABLET ORAL EVERY 6 HOURS PRN
COMMUNITY
End: 2024-07-26

## 2024-07-23 NOTE — PRE-PROCEDURE INSTRUCTIONS
Pre-Surgery Instructions:   Medication Instructions    b complex vitamins capsule Hold day of surgery.    CALCIUM-MAGNESIUM-VITAMIN D PO Hold day of surgery.    ibuprofen (MOTRIN) 200 mg tablet Stop taking 3 days prior to surgery.    oxyCODONE-acetaminophen (PERCOCET) 5-325 mg per tablet Uses PRN- OK to take day of surgery    rosuvastatin (CRESTOR) 20 MG tablet Take night before surgery    TEGretol- MG 12 hr tablet Take day of surgery.    VITAMIN D PO Hold day of surgery.    Medication instructions for day surgery reviewed. Please use only a sip of water to take your instructed medications. Avoid all over the counter vitamins, supplements and NSAIDS for one week prior to surgery per anesthesia guidelines. Tylenol is ok to take as needed.     You will receive a call one business day prior to surgery with an arrival time and hospital directions. If your surgery is scheduled on a Monday, the hospital will be calling you on the Friday prior to your surgery. If you have not heard from anyone by 8pm, please call the hospital supervisor through the hospital  at [phone]. ([place] [phone] or [place] [phone]).    Do not eat or drink anything after midnight the night before your surgery, including candy, mints, lifesavers, or chewing gum. Do not drink alcohol 24hrs before your surgery. Try not to smoke at least 24hrs before your surgery.       Follow the pre surgery showering instructions as listed in the “My Surgical Experience Booklet” or otherwise provided by your surgeon's office. Do not use a blade to shave the surgical area 1 week before surgery. It is okay to use a clean electric clippers up to 24 hours before surgery. Do not apply any lotions, creams, including makeup, cologne, deodorant, or perfumes after showering on the day of your surgery. Do not use dry shampoo, hair spray, hair gel, or any type of hair products.     No contact lenses, eye make-up, or artificial eyelashes. Remove  nail polish, including gel polish, and any artificial, gel, or acrylic nails if possible. Remove all jewelry including rings and body piercing jewelry.     Wear causal clothing that is easy to take on and off. Consider your type of surgery.    Keep any valuables, jewelry, piercings at home. Please bring any specially ordered equipment (sling, braces) if indicated.    Arrange for a responsible person to drive you to and from the hospital on the day of your surgery. Please confirm the visitor policy for the day of your procedure when you receive your phone call with an arrival time.     Call the surgeon's office with any new illnesses, exposures, or additional questions prior to surgery.    Please reference your “My Surgical Experience Booklet” for additional information to prepare for your upcoming surgery.

## 2024-07-25 ENCOUNTER — ANESTHESIA EVENT (OUTPATIENT)
Dept: PERIOP | Facility: HOSPITAL | Age: 59
End: 2024-07-25
Payer: COMMERCIAL

## 2024-07-26 ENCOUNTER — HOSPITAL ENCOUNTER (OUTPATIENT)
Dept: RADIOLOGY | Facility: HOSPITAL | Age: 59
Setting detail: OUTPATIENT SURGERY
Discharge: HOME | End: 2024-07-26
Payer: COMMERCIAL

## 2024-07-26 ENCOUNTER — HOSPITAL ENCOUNTER (OUTPATIENT)
Facility: HOSPITAL | Age: 59
Setting detail: OUTPATIENT SURGERY
Discharge: HOME | End: 2024-07-26
Attending: ORTHOPAEDIC SURGERY | Admitting: ORTHOPAEDIC SURGERY
Payer: COMMERCIAL

## 2024-07-26 ENCOUNTER — ANESTHESIA (OUTPATIENT)
Dept: PERIOP | Facility: HOSPITAL | Age: 59
End: 2024-07-26
Payer: COMMERCIAL

## 2024-07-26 VITALS
RESPIRATION RATE: 18 BRPM | OXYGEN SATURATION: 97 % | BODY MASS INDEX: 19.08 KG/M2 | TEMPERATURE: 98.3 F | HEART RATE: 67 BPM | HEIGHT: 64 IN | SYSTOLIC BLOOD PRESSURE: 131 MMHG | WEIGHT: 111.77 LBS | DIASTOLIC BLOOD PRESSURE: 59 MMHG

## 2024-07-26 DIAGNOSIS — S42.021A CLOSED DISPLACED FRACTURE OF SHAFT OF RIGHT CLAVICLE, INITIAL ENCOUNTER: ICD-10-CM

## 2024-07-26 DIAGNOSIS — S42.021A CLOSED DISPLACED FRACTURE OF SHAFT OF RIGHT CLAVICLE, INITIAL ENCOUNTER: Primary | ICD-10-CM

## 2024-07-26 PROCEDURE — C1713 ANCHOR/SCREW BN/BN,TIS/BN: HCPCS | Performed by: ORTHOPAEDIC SURGERY

## 2024-07-26 PROCEDURE — C9290 INJ, BUPIVACAINE LIPOSOME: HCPCS | Performed by: ANESTHESIOLOGY

## 2024-07-26 PROCEDURE — 73000 X-RAY EXAM OF COLLAR BONE: CPT

## 2024-07-26 PROCEDURE — 23515 OPTX CLAVICULAR FX W/INT FIX: CPT

## 2024-07-26 PROCEDURE — 23515 OPTX CLAVICULAR FX W/INT FIX: CPT | Performed by: ORTHOPAEDIC SURGERY

## 2024-07-26 DEVICE — 2.7MM CORTEX SCREW SLF-TPNG WITH T8 STARDRIVE RECESS 14MM: Type: IMPLANTABLE DEVICE | Site: CLAVICLE | Status: FUNCTIONAL

## 2024-07-26 DEVICE — 2.7MM VA LCKNG SCREW SLF-TPNG WITH T8 STARDRIVE RECESS 18MM: Type: IMPLANTABLE DEVICE | Site: CLAVICLE | Status: FUNCTIONAL

## 2024-07-26 DEVICE — 2.7MM VA LCKNG SCREW SLF-TPNG WITH T8 STARDRIVE RECESS 14MM: Type: IMPLANTABLE DEVICE | Site: CLAVICLE | Status: FUNCTIONAL

## 2024-07-26 DEVICE — 3.5MM STARDRIVE CORTEX SCREW SELF-TAPPING 12MM: Type: IMPLANTABLE DEVICE | Site: CLAVICLE | Status: FUNCTIONAL

## 2024-07-26 DEVICE — 2.7MM VA LCKNG SCREW SLF-TPNG WITH T8 STARDRIVE RECESS 16MM: Type: IMPLANTABLE DEVICE | Site: CLAVICLE | Status: FUNCTIONAL

## 2024-07-26 DEVICE — 2.7MM VA LCP CLAVICLE PLATE SHAFT/ CS1/ RIGHT: Type: IMPLANTABLE DEVICE | Site: CLAVICLE | Status: FUNCTIONAL

## 2024-07-26 RX ORDER — CEFAZOLIN SODIUM 2 G/50ML
2000 SOLUTION INTRAVENOUS ONCE
Status: COMPLETED | OUTPATIENT
Start: 2024-07-26 | End: 2024-07-26

## 2024-07-26 RX ORDER — ACETAMINOPHEN 325 MG/1
325 TABLET ORAL EVERY 6 HOURS PRN
Qty: 30 TABLET | Refills: 0 | Status: SHIPPED | OUTPATIENT
Start: 2024-07-26

## 2024-07-26 RX ORDER — CHLORHEXIDINE GLUCONATE ORAL RINSE 1.2 MG/ML
15 SOLUTION DENTAL ONCE
Status: COMPLETED | OUTPATIENT
Start: 2024-07-26 | End: 2024-07-26

## 2024-07-26 RX ORDER — HYDROMORPHONE HCL/PF 1 MG/ML
0.5 SYRINGE (ML) INJECTION
Status: DISCONTINUED | OUTPATIENT
Start: 2024-07-26 | End: 2024-07-26 | Stop reason: HOSPADM

## 2024-07-26 RX ORDER — LIDOCAINE HCL/PF 100 MG/5ML
SYRINGE (ML) INJECTION AS NEEDED
Status: DISCONTINUED | OUTPATIENT
Start: 2024-07-26 | End: 2024-07-26

## 2024-07-26 RX ORDER — ROCURONIUM BROMIDE 10 MG/ML
INJECTION, SOLUTION INTRAVENOUS AS NEEDED
Status: DISCONTINUED | OUTPATIENT
Start: 2024-07-26 | End: 2024-07-26

## 2024-07-26 RX ORDER — OXYCODONE HYDROCHLORIDE 5 MG/1
5 TABLET ORAL EVERY 4 HOURS PRN
Status: DISCONTINUED | OUTPATIENT
Start: 2024-07-26 | End: 2024-07-26 | Stop reason: HOSPADM

## 2024-07-26 RX ORDER — SODIUM CHLORIDE 9 MG/ML
125 INJECTION, SOLUTION INTRAVENOUS CONTINUOUS
Status: DISCONTINUED | OUTPATIENT
Start: 2024-07-26 | End: 2024-07-26 | Stop reason: HOSPADM

## 2024-07-26 RX ORDER — PROPOFOL 10 MG/ML
INJECTION, EMULSION INTRAVENOUS AS NEEDED
Status: DISCONTINUED | OUTPATIENT
Start: 2024-07-26 | End: 2024-07-26

## 2024-07-26 RX ORDER — OXYCODONE HYDROCHLORIDE 5 MG/1
5 TABLET ORAL EVERY 4 HOURS PRN
Qty: 15 TABLET | Refills: 0 | Status: SHIPPED | OUTPATIENT
Start: 2024-07-26

## 2024-07-26 RX ORDER — MAGNESIUM HYDROXIDE 1200 MG/15ML
LIQUID ORAL AS NEEDED
Status: DISCONTINUED | OUTPATIENT
Start: 2024-07-26 | End: 2024-07-26 | Stop reason: HOSPADM

## 2024-07-26 RX ORDER — MIDAZOLAM HYDROCHLORIDE 2 MG/2ML
INJECTION, SOLUTION INTRAMUSCULAR; INTRAVENOUS AS NEEDED
Status: DISCONTINUED | OUTPATIENT
Start: 2024-07-26 | End: 2024-07-26

## 2024-07-26 RX ORDER — ONDANSETRON 2 MG/ML
4 INJECTION INTRAMUSCULAR; INTRAVENOUS ONCE AS NEEDED
Status: DISCONTINUED | OUTPATIENT
Start: 2024-07-26 | End: 2024-07-26 | Stop reason: HOSPADM

## 2024-07-26 RX ORDER — FENTANYL CITRATE 50 UG/ML
INJECTION, SOLUTION INTRAMUSCULAR; INTRAVENOUS AS NEEDED
Status: DISCONTINUED | OUTPATIENT
Start: 2024-07-26 | End: 2024-07-26

## 2024-07-26 RX ORDER — ONDANSETRON 2 MG/ML
INJECTION INTRAMUSCULAR; INTRAVENOUS AS NEEDED
Status: DISCONTINUED | OUTPATIENT
Start: 2024-07-26 | End: 2024-07-26

## 2024-07-26 RX ORDER — PHENYLEPHRINE HCL IN 0.9% NACL 1 MG/10 ML
SYRINGE (ML) INTRAVENOUS AS NEEDED
Status: DISCONTINUED | OUTPATIENT
Start: 2024-07-26 | End: 2024-07-26

## 2024-07-26 RX ORDER — BUPIVACAINE HYDROCHLORIDE 5 MG/ML
INJECTION, SOLUTION EPIDURAL; INTRACAUDAL
Status: COMPLETED | OUTPATIENT
Start: 2024-07-26 | End: 2024-07-26

## 2024-07-26 RX ORDER — DEXAMETHASONE SODIUM PHOSPHATE 10 MG/ML
INJECTION, SOLUTION INTRAMUSCULAR; INTRAVENOUS AS NEEDED
Status: DISCONTINUED | OUTPATIENT
Start: 2024-07-26 | End: 2024-07-26

## 2024-07-26 RX ORDER — FENTANYL CITRATE/PF 50 MCG/ML
25 SYRINGE (ML) INJECTION
Status: DISCONTINUED | OUTPATIENT
Start: 2024-07-26 | End: 2024-07-26 | Stop reason: HOSPADM

## 2024-07-26 RX ORDER — NAPROXEN 500 MG/1
500 TABLET ORAL 2 TIMES DAILY WITH MEALS
Qty: 60 TABLET | Refills: 0 | Status: SHIPPED | OUTPATIENT
Start: 2024-07-26

## 2024-07-26 RX ADMIN — PROPOFOL 150 MG: 10 INJECTION, EMULSION INTRAVENOUS at 13:18

## 2024-07-26 RX ADMIN — BUPIVACAINE 10 ML: 13.3 INJECTION, SUSPENSION, LIPOSOMAL INFILTRATION at 12:03

## 2024-07-26 RX ADMIN — SODIUM CHLORIDE 125 ML/HR: 0.9 INJECTION, SOLUTION INTRAVENOUS at 10:13

## 2024-07-26 RX ADMIN — FENTANYL CITRATE 100 MCG: 50 INJECTION INTRAMUSCULAR; INTRAVENOUS at 11:59

## 2024-07-26 RX ADMIN — MIDAZOLAM 2 MG: 1 INJECTION INTRAMUSCULAR; INTRAVENOUS at 11:59

## 2024-07-26 RX ADMIN — PHENYLEPHRINE HYDROCHLORIDE 20 MCG/MIN: 10 INJECTION INTRAVENOUS at 13:34

## 2024-07-26 RX ADMIN — ROCURONIUM BROMIDE 10 MG: 10 INJECTION, SOLUTION INTRAVENOUS at 14:13

## 2024-07-26 RX ADMIN — CHLORHEXIDINE GLUCONATE 15 ML: 1.2 RINSE ORAL at 10:13

## 2024-07-26 RX ADMIN — ONDANSETRON 4 MG: 2 INJECTION INTRAMUSCULAR; INTRAVENOUS at 14:37

## 2024-07-26 RX ADMIN — DEXAMETHASONE SODIUM PHOSPHATE 5 MG: 10 INJECTION INTRAMUSCULAR; INTRAVENOUS at 14:04

## 2024-07-26 RX ADMIN — Medication 100 MCG: at 13:33

## 2024-07-26 RX ADMIN — SUGAMMADEX 110 MG: 100 INJECTION, SOLUTION INTRAVENOUS at 14:52

## 2024-07-26 RX ADMIN — LIDOCAINE HYDROCHLORIDE 100 MG: 20 INJECTION INTRAVENOUS at 13:18

## 2024-07-26 RX ADMIN — ROCURONIUM BROMIDE 40 MG: 10 INJECTION, SOLUTION INTRAVENOUS at 13:18

## 2024-07-26 RX ADMIN — BUPIVACAINE HYDROCHLORIDE 10 ML: 5 INJECTION, SOLUTION EPIDURAL; INTRACAUDAL; PERINEURAL at 12:03

## 2024-07-26 RX ADMIN — CEFAZOLIN SODIUM 2000 MG: 2 SOLUTION INTRAVENOUS at 13:31

## 2024-07-26 RX ADMIN — Medication 50 MCG: at 14:14

## 2024-07-26 NOTE — OP NOTE
OPERATIVE REPORT  PATIENT NAME: Jessica Robles    :  1965  MRN: [id number]  Pt Location: [facility] OR ROOM 01    SURGERY DATE: 2024    Surgeons and Role:     * Boris Loco MD - Primary     * BRO Watson-C - Assisting    Preop Diagnosis:  Closed displaced fracture of shaft of right clavicle, initial encounter [S42.021A]    Post-Op Diagnosis Codes:     * Closed displaced fracture of shaft of right clavicle, initial encounter [S42.021A]    Procedure(s):  Right - OPEN REDUCTION W/ INTERNAL FIXATION (ORIF) CLAVICLE     Specimen(s):  * No specimens in log *    Estimated Blood Loss:   200 mL    Drains:  * No LDAs found *    Anesthesia Type:   General w/ Regional    Operative Indications:  Closed displaced fracture of shaft of right clavicle, initial encounter [S42.021A]      Operative Findings:  As expected      Complications:   None    Procedure and Technique:    ASSISTING:   I requested BRO Rivas to 1st assist to assist with this procedure. Assistance was medically necessary in order to safely perform the procedure without increased blood loss or morbidity. Assistance was provided through positioning, instrumentation, and retraction of incisions for better visualization of underlying structures and cauterization for hemostasis. Assistance was also provided through wound closure, application of sterile dressing, and safe transport from the operative suite.    No qualified resident physician was available for assistance    PROCEDURE:  1. Open reduction internal fixation of the right clavicle    INDICATIONS:  Jessica Robles is a 58 y.o. who had sustained a fall and injured their right shoulder, which was followed by pain and dysfunction. X-rays demonstrated comminuted midshaft clavicle fracture with greater than 2cm of displacement. The patient is an active individual who is frequently using the upper extremity for work and other activities, I recommended open reduction internal fixation of  the clavicle. We did discuss the risks, benefits and alternatives to operative treatment. The risks of operative treatment included, but was not limited to nonunion, malunion, neurovascular injury, bleeding, blood clot, risk of pneumothorax, infection, hardware prominence, numbness to anterior chest and painful hardware postoperatively. Patient voiced their understanding of the nonoperative treatments and risks of the surgery and would like to proceed with operative intervention. After medical clearance , we elected to proceed with the surgery.    DESCRIPTION OF PROCEDURE:  The patient was identified on the floor and the correct operative site was signed by me. The patient was then brought into the Operating Room and Anesthesia then successfully introduced a regional block without complications. The patient was then positioned in a beach chair position. The operative shoulder and upper extremity were prepped and draped in the usual sterile fashion. Prior to making incision, a time-out occurred at which time all members of the surgical team confirmed the patient identity, laterality and correct operation against the informed written consent. It was also confirmed that the patient received preoperative antibiotics.    Next, we began with a standard anterior approach to the clavicle. The skin was divided and the fascia was divided down to the clavicle on the subcutaneous border. Care was taken to preserve and identify any crossing supraclavicular cluneal nerves. At this point, the fracture fragments were encountered. The fracture was comminuted consisting of 3 fragments. The fracture pattern was an transverse fracture with a butterfly fragment anteriorly. Using reduction forceps and k-wires, the fracture was reduced and held provisionally for superior plate application.  A lag screw was placed from anterior to posterior, 3.5 screw provisionally holding the fracture and reduction. A precontoured [employer]ynthe[employer] superior  clavicle plate was placed which spanned the fracture, provided excellent option for fixation with a minimum of 3 holes medial and 3 holes lateral to the fracture. We then checked our reduction using fluoro and being satisfied with this proceeded to fixate the plate.  Several screws were then placed both medially and laterally, bringing the plate down to the bone, and obtaining excellent stability of the fracture.  Fluoroscopy was used to check our alignment, length and rotation, as well as our screw lengths. We copiously irrigated the incision. We performed a layered closure, using 0-vicryl for deep layer, repairing muscle insertion, 2-0 vicryl for the subcuticular layer and skin closure was performed using 3-0 Monocryl and surgical glue. Sterile dressing was then applied. The patient was then placed into a sling and taken to the PACU where they recovered without incident. At the conclusion of the case, the patient tolerated the procedure well. No complications. All sponge and instrument counts were correct.    POSTOPERATIVE PLAN:  Sling for 4 weeks,   Early Mobilization for DVT ppx Weeks 1-4,   Nonweightbearing,   will be following the clavicle ORIF protocol:PROM to AAROM as tolerated,   except the following: No active IR/ extension for 4 weeks. PROM in biceps flexion for first 4 weeks  No resisted internal rotation/backward extension until 8-10 weeks post-op  strengthening OK    RTC in 2 weeks for wound check and sutures out     I was present for the entire procedure.    Patient Disposition:  PACU         SIGNATURE: Boris Loco MD  DATE: July 26, 2024  TIME: 5:36 PM

## 2024-07-26 NOTE — DISCHARGE INSTR - AVS FIRST PAGE
POSTOPERATIVE INSTRUCTIONS    MEDICATIONS:  Resume all home medications unless otherwise instructed by your surgeon.  Pain Medication:  Oxycodone 5 mg, 1 tablets every 4 hours as needed  If you were given a regional anesthetic (nerve block), please begin taking the pain medication as soon as you get home, even if you have minimal or no pain.  DO NOT WAIT FOR THE NERVE BLOCK TO WEAR OFF.  Possible side effects include nausea, constipation, and urinary retention.  If you experience these side effects, please call our office for assistance.  Pain med refills are authorized only during office hours (8am-4pm, Mon-Fri).  Anti-Inflammatory:  Tylenol 325 mg, 1-2 tablets every 6 hours and Naproxen 500 mg, 1 tablet every 12 hours  Take with food.  Stop if you experience nausea, reflux, or stomach pain.    WOUND CARE:  Keep the dressing clean and dry.  Light drainage may occur the first 2 days postop.  DO NOT REMOVE THE DRESSINGS until you are seen in our office.  Cover the bandages appropriately while washing to keep them from getting wet.  Please call our office ([phone]) if you experience any of the following:  Sudden increase in swelling, redness, or warmth at the surgical site  Excessive incisional drainage that persists beyond the 3rd day after surgery  Oral temperature greater than 101 degrees, not relieved with Tylenol  Shortness of breath, chest pain, nausea, or any other concerning symptoms    SWELLING CONTROL:  Cold Therapy:  Apply ice (20 min on, 20 min off) as often as you feel is necessary.  Elevation:  Keep your arm at or above heart level as much as possible.    IMMOBILIZATION:  Wear your sling AT ALL TIMES (including sleep) until your first postoperative office visit.  You may remove the sling for showering but must keep your arm at your side.    ACTIVITY:  DO NOT lift, carry, push, or pull anything with your arm until cleared by your surgeon.  Wrist / Finger Motion:  Move your wrist and fingers (if not  immobilized in splint) through a full range of motion 20 times per hour while awake.    FOLLOW-UP APPOINTMENT:  10-14 days after surgery with:    Dr. Boris Loco MD

## 2024-07-26 NOTE — ANESTHESIA PREPROCEDURE EVALUATION
Procedure:  OPEN REDUCTION W/ INTERNAL FIXATION (ORIF) CLAVICLE plus all other indicated procedures (Right: Chest)    Relevant Problems   ANESTHESIA (within normal limits)      CARDIO   (+) Mixed hyperlipidemia      ENDO (within normal limits)      GI/HEPATIC (within normal limits)      HEMATOLOGY (within normal limits)      NEURO/PSYCH   (+) Seizures (HCC)      PULMONARY (within normal limits)      Orthopedic/Musculoskeletal   (+) Closed displaced fracture of shaft of right clavicle        Physical Exam    Airway    Mallampati score: I  TM Distance: >3 FB  Neck ROM: full     Dental   No notable dental hx     Cardiovascular  Cardiovascular exam normal    Pulmonary  Pulmonary exam normal     Other Findings  post-pubertal.      Anesthesia Plan  ASA Score- 2     Anesthesia Type- general with ASA Monitors.         Additional Monitors:     Airway Plan: ETT.    Comment: ISB for postop pain control requested by surgeon.       Plan Factors-    Chart reviewed.    Patient summary reviewed.                  Induction- intravenous.    Postoperative Plan-         Informed Consent- Anesthetic plan and risks discussed with patient.

## 2024-07-26 NOTE — ANESTHESIA POSTPROCEDURE EVALUATION
Post-Op Assessment Note    CV Status:  Stable  Pain Score: 0    Pain management: adequate       Mental Status:  Alert and awake   Hydration Status:  Stable and euvolemic   PONV Controlled:  None   Airway Patency:  Patent     Post Op Vitals Reviewed: Yes    No anethesia notable event occurred.    Staff: OLIVIA               /66 (07/26/24 1507)    Temp (!) 97.2 °F (36.2 °C) (07/26/24 1507)    Pulse 68 (07/26/24 1507)   Resp 17 (07/26/24 1507)    SpO2 100 % (07/26/24 1507)

## 2024-07-26 NOTE — ANESTHESIA PROCEDURE NOTES
Peripheral Block    Patient location during procedure: holding area  Start time: 7/26/2024 11:58 AM  Reason for block: at surgeon's request and post-op pain management  Staffing  Performed by: Nuha Arellano DO  Authorized by: Nuha Arellano DO    Preanesthetic Checklist  Completed: patient identified, IV checked, site marked, risks and benefits discussed, surgical consent, monitors and equipment checked, pre-op evaluation and timeout performed  Peripheral Block  Patient position: sitting  Prep: ChloraPrep  Patient monitoring: frequent blood pressure checks, continuous pulse oximetry and heart rate  Block type: Interscalene  Laterality: right  Injection technique: single-shot  Procedures: ultrasound guided, Ultrasound guidance required for the procedure to increase accuracy and safety of medication placement and decrease risk of complications.  Ultrasound permanent image saved  bupivacaine (PF) (MARCAINE) 0.5 % injection 20 mL - Perineural   10 mL - 7/26/2024 12:03:00 PM  bupivacaine liposomal (EXPAREL) 1.3 % injection 20 mL - Perineural   10 mL - 7/26/2024 12:03:00 PM  Needle  Needle type: Stimuplex   Needle gauge: 20 G  Needle length: 2 in  Needle localization: anatomical landmarks, ultrasound guidance and nerve stimulator  Assessment  Injection assessment: incremental injection, frequent aspiration, injected with ease, negative aspiration, negative for heart rate change, no paresthesia on injection, no symptoms of intraneural/intravenous injection and needle tip visualized at all times  Paresthesia pain: none  Post-procedure:  site cleaned  patient tolerated the procedure well with no immediate complications

## 2024-08-09 ENCOUNTER — APPOINTMENT (OUTPATIENT)
Dept: RADIOLOGY | Age: 59
End: 2024-08-09
Payer: COMMERCIAL

## 2024-08-09 ENCOUNTER — OFFICE VISIT (OUTPATIENT)
Dept: OBGYN CLINIC | Facility: CLINIC | Age: 59
End: 2024-08-09

## 2024-08-09 VITALS
HEART RATE: 65 BPM | HEIGHT: 64 IN | BODY MASS INDEX: 19.29 KG/M2 | DIASTOLIC BLOOD PRESSURE: 84 MMHG | SYSTOLIC BLOOD PRESSURE: 129 MMHG | WEIGHT: 113 LBS

## 2024-08-09 DIAGNOSIS — S42.021A CLOSED DISPLACED FRACTURE OF SHAFT OF RIGHT CLAVICLE, INITIAL ENCOUNTER: Primary | ICD-10-CM

## 2024-08-09 DIAGNOSIS — S42.021A CLOSED DISPLACED FRACTURE OF SHAFT OF RIGHT CLAVICLE, INITIAL ENCOUNTER: ICD-10-CM

## 2024-08-09 PROCEDURE — 99024 POSTOP FOLLOW-UP VISIT: CPT | Performed by: ORTHOPAEDIC SURGERY

## 2024-08-09 PROCEDURE — 73000 X-RAY EXAM OF COLLAR BONE: CPT

## 2024-08-09 NOTE — PROGRESS NOTES
Subjective   CHIEF COMPLAINT/REASON FOR VISIT  Jessica Robles is a 58 y.o. female who presents for 2 week post op follow-up after OPEN REDUCTION W/ INTERNAL FIXATION (ORIF) CLAVICLE plus all other indicated procedures - Right on 7/26/2024     HISTORY OF PRESENT ILLNESS  Overall, she feels things are going well and progressing appropriately. Pain has been well controlled. She has been following the post-operative rehabilitation regimen without difficultly.     Objective   PHYSICAL EXAMINATION  Right Shoulder  Surgical incisions are healed.     General:   Well-appearing  No acute distress  Appears stated age    Cervical Spine    right Shoulder  Comfortably resting in sling  Motor strength intact distally  Incision c/d/I  Some clinton-incisional numbness distal to incision  Sensation to light touch is normal in the axillary, radial, ulnar, and median nerve distributions.  Fingers warm and perfused    Assessment & Plan   1. Status Post OPEN REDUCTION W/ INTERNAL FIXATION (ORIF) CLAVICLE plus all other indicated procedures - Right    She is doing well after surgery and making appropriate progress.     We will plan to see her back at the 8 week post-operative isela. If any issues, questions, or concerns arise between now and the next appointment, we have encouraged the patient contact our team.    PLAN:    2 weeks s/p ORIF right clavicle   Mepilax patch removed  Can stop wearing the sling; only wear for comfort if needed  Formal physical therapy discussed but not opted for. Home exercise program provided below  5-10 pound weight restriction for the next 4 weeks. Ok to lift a coffee cup.  Follow up in 3 months from surgery   Please call the clinic with any questions or concerns.    Clavicle ORIF Rehab Protocol    POST OPERATIVE MANAGEMENT:  Remain in arm sling for first 6 weeks post-op  Do not elevate surgical arm above 90° in any plane for first 4 weeks post-op  Do not lift any objects over 5 pounds with the surgical arm  for first 6 weeks post-op  Avoid repeated reaching for the first 6 weeks  Cryotherapy (ice machine, ice bags) 3-5 times/day for swelling, inflammation, and pain control  Maintain good upright shoulder girdle posture at all times and during sling use    Week 1:   Pendulum swings   squeeze ball   triceps with Thera band   isometric rotator cuff external and internal rotations with arm at side   isometric shoulder abduction, adduction, extension and flexion with arm at side.   Soft-tissue treatments for associated shoulder and neck musculature for comfort.   Cardiovascular training such as stationary bike throughout rehabilitation period.    Weeks 2 - 6:   Soft-tissue treatments for associated shoulder and neck musculature for comfort.   Gentle pulley for shoulder ROM 2x/day.   Elbow pivots PNF, wrist PNF.   Isometric scapular PNF, mid-range.   Strive for progressive gains to active 90 degrees of shoulder flexion and abduction (though not beyond  90 degrees).    Weeks 6 - 8:   Start mid-range of motion rotator cuff external and internal rotations   Active and light resistance exercises (through 75% of ROM as patient’s symptoms permit) without  shoulder elevation and avoiding extreme end ROM.    Weeks 8 - 12:   Full shoulder Active ROM in all planes.   Increase manual mobilizations of soft tissue as well as glenohumeral and scapulothoracic joints for  ROM.   No repeated heavy resisted exercises or lifting until 3 months.    Weeks 12+:   Start a more aggressive strengthening program as tolerated.   Increase the intensity of strength and functional training for gradual return to activities and sports.   Return to specific sports is determined by the physical therapist through functional testing specific to  the injury.     Scribe Attestation      I,:   am acting as a scribe while in the presence of the attending physician.:       I,:   personally performed the services described in this documentation    as scribed in my  presence.:

## 2024-08-14 ENCOUNTER — TELEPHONE (OUTPATIENT)
Dept: OTHER | Facility: HOSPITAL | Age: 59
End: 2024-08-14

## 2024-08-14 NOTE — TELEPHONE ENCOUNTER
Please review    Patient comment: DR Jones, I have not been able fill [employer] Script since you wrote it due to the vendor not having any. It looks like my Ins will cover it if you write it for Carbamazipine er 400 180 pills 90 day mail to home. Can you do so and send it in to them as I can't seem to upload the printed version to them. It seems they will cover Generic which is fine. Thanks in advance.

## 2024-08-20 DIAGNOSIS — R56.9 SEIZURES (HCC): Primary | ICD-10-CM

## 2024-08-20 RX ORDER — CARBAMAZEPINE 400 MG/1
400 TABLET, EXTENDED RELEASE ORAL 2 TIMES DAILY
Qty: 180 TABLET | Refills: 3 | Status: SHIPPED | OUTPATIENT
Start: 2024-08-20 | End: 2024-08-26 | Stop reason: SDUPTHER

## 2024-08-22 ENCOUNTER — EVALUATION (OUTPATIENT)
Dept: PHYSICAL THERAPY | Facility: CLINIC | Age: 59
End: 2024-08-22
Payer: COMMERCIAL

## 2024-08-22 DIAGNOSIS — S42.021A CLOSED DISPLACED FRACTURE OF SHAFT OF RIGHT CLAVICLE, INITIAL ENCOUNTER: Primary | ICD-10-CM

## 2024-08-22 DIAGNOSIS — Z98.890 STATUS POST OPEN REDUCTION WITH INTERNAL FIXATION OF FRACTURE: ICD-10-CM

## 2024-08-22 DIAGNOSIS — Z87.81 STATUS POST OPEN REDUCTION WITH INTERNAL FIXATION OF FRACTURE: ICD-10-CM

## 2024-08-22 DIAGNOSIS — R56.9 SEIZURES (HCC): ICD-10-CM

## 2024-08-22 PROCEDURE — 97110 THERAPEUTIC EXERCISES: CPT

## 2024-08-22 PROCEDURE — 97535 SELF CARE MNGMENT TRAINING: CPT

## 2024-08-22 PROCEDURE — 97162 PT EVAL MOD COMPLEX 30 MIN: CPT

## 2024-08-22 RX ORDER — CARBAMAZEPINE 400 MG/1
400 TABLET, EXTENDED RELEASE ORAL 2 TIMES DAILY
Qty: 180 TABLET | Refills: 3 | OUTPATIENT
Start: 2024-08-22

## 2024-08-22 NOTE — PROGRESS NOTES
PT Evaluation     Today's date: 2024  Patient name: Jessica Robles  : 1965  MRN: [id number]  Referring provider: Mohit Brooks PA*  Dx:   Encounter Diagnosis     ICD-10-CM    1. Closed displaced fracture of shaft of right clavicle, initial encounter  S42.021A       2. Status post open reduction with internal fixation of fracture  Z98.890     Z87.81                      Assessment  Impairments: abnormal muscle tone, abnormal or restricted ROM, abnormal movement, impaired physical strength, lacks appropriate home exercise program, pain with function and activity limitations  Symptom irritability: low    Assessment details: Patient is a 59 year old female presenting to this facility s/p ORIF R clavicle fracture on 24 that she sustained from tripping over her cat on 24. She does report a LOC when the incident happened, just remembers her  picking her up from the floor. Since having surgery, patient has been released from wearing her sling from the doctor and provided with exercises to work on. 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 Reviewed protocol attached to last doctors visit and restrictions listed with patient. Upon evaluation, patient reported some numbness to area of R clavicular incision with TTP over proximal and distal clavicle. Minimal deficits noted in RUE ROM and strength. Patient is progressing well since surgery. She was provided with HEP to target remaining deficits and would benefit from continued physical therapy to maximize independence with ADLs, improve functional mobility, and to decrease pain/tenderness.   Understanding of Dx/Px/POC: good     Prognosis: good    Goals  ST.  Decrease pain 50% 6 wk  2.  Increase shoulder ROM by 10 degrees 6 wk  3.  Increase shoulder strength to 4+/5 6 wk  4.  Pt  will report no difficulty with activity below shoulder level 6 wk    LT.  Pt will report no pain 12 wk  2.  Increase shoulder ROM to WNL 12 wk  3.  Increase shoulder strength to WNL 12 wk  4.  Pt will report no limitations with ADL's 12 wk    Plan  Patient would benefit from: skilled physical therapy and PT eval  Planned modality interventions: cryotherapy and thermotherapy: hydrocollator packs    Planned therapy interventions: ADL training, body mechanics training, flexibility, functional ROM exercises, graded exercise, home exercise program, manual therapy, neuromuscular re-education, patient/caregiver education, postural training, self care, strengthening, stretching, therapeutic activities and therapeutic exercise    Frequency: 3x week  Duration in weeks: 12  Treatment plan discussed with: patient        Subjective Evaluation    History of Present Illness  Date of onset: 2024  Date of surgery: 2024  Mechanism of injury: surgery  Mechanism of injury: Patient is a 59 year old female presenting to this facility s/p ORIF R clavicle fracture on 24 that she sustained from tripping over her cat on 24.   She reports LOC when the incident happened, just remembers her  picking her up from the floor. Pt reports that she feels she is doing good, she is working on the ROM and she is getting this back. There is still a lot of pressure over the clavicle and it hurts after using it a lot. She was given some exercises on 24 from her doctor and she has been working on these.She denies swelling, it just feels that it wants to kink up on her and it is heavy, even to wear a bra feels very heavy in that area. She reports numbness over the area of incision but no radicular symptoms. She reports the doctor told her he cut through a nerve so she may experience this for a ehile. Pt reports that she does like to walk a lot, she likes to exercise and do things like planks, abdominal strengthening on all  4s and this had been hard. She has been challenged with everyday lifting. 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 When she feels that she overused it, she does have a sleeve with ice that she uses.   She had been doing the exercises for her shoulder about 3 times a day, sets of 3.   She will be leaving to live in [place] in October for 5 months. She plans to do PT for her shoulder till sometime in Sept as necessary.   Patient Goals  Patient goals for therapy: decreased pain, increased motion, increased strength, independence with ADLs/IADLs, return to sport/leisure activities and return to work    Pain  Current pain ratin  At best pain ratin  At worst pain ratin  Location: R distal clavicle area  Quality: discomfort, pressure and cramping  Relieving factors: ice and rest (sleeps with arm on pillow)  Aggravating factors: overhead activity and lifting  Progression: improved    Hand dominance: right      Diagnostic Tests  X-ray: abnormal (pre and post surgical x-rays in chat)        Objective     Postural Observations  Seated posture: good  Standing posture: good    Additional Postural Observation Details  Slight scap winging in R as compared to L  Incision over R clavicle appears to be healing well with no signs of infection. Slight adhesions and tightness near proximal incision    Palpation     Right   Tenderness of the anterior deltoid, deltoid, levator scapulae, middle deltoid, middle trapezius, posterior deltoid and upper trapezius.     Tenderness     Right Shoulder  Tenderness in the clavicle (proximal clavicle tenderness).     Active Range of Motion   Left Shoulder   Normal active range of motion  External rotation 45°: WFL  Internal rotation 45°: 70 degrees     Right Shoulder   Flexion: 135 degrees   Abduction: 135 degrees   External rotation 45°: 65 degrees   External rotation BTH: C2   Internal rotation  45°: 70 degrees   Internal rotation BTB: L1     Additional Active Range of Motion Details  Little more difficulty with ER and IR as per pt on the RUE     Passive Range of Motion     Right Shoulder   Flexion: 160 degrees   Abduction: 160 degrees   External rotation 45°: 70 degrees   Internal rotation 45°: 70 degrees     Strength/Myotome Testing     Left Shoulder     Planes of Motion   Flexion: 4+   Extension: 5   Abduction: 4+   Adduction: 5   External rotation at 0°: 4+   Internal rotation at 0°: 4+     Right Shoulder     Planes of Motion   Flexion: 4   Extension: 5   Abduction: 4   Adduction: 5   External rotation at 0°: 4   Internal rotation at 0°: 4+     Left Elbow   Flexion: 5  Extension: 5    Right Elbow   Flexion: 5  Extension: 5    Ambulation     Quality of Movement During Gait     Additional Quality of Movement During Gait Details  Pt demonstrates normal gait and good arm swing with ambulation     Functional Assessment        Comments  Pt able to perform functional reach shoulder height with no difficulty and good mechanics.   Graphical documentation:                     Precautions: Hx seizures       HEP:[id number]       Manuals Eval 8-22-24       STM, scar mobs as needed                                 Neuro Re-Ed         Pulleys         Foam roll up wall         Scap squeeze        AROM PNF patterns         Shoulder rolls bwd                        Ther Ex        UBE        Wall ball         Shoulder taps         MTP/LTP        ER/IR        Banded d2 flexion supine-> standing        Banded pull parts         Tricep ext webslide         Supine chest press                 Pt Eval  Pt educated on doctors restrictions and instructed through HEP with visual and verbal education       Ther Activity                        Gait Training                        Modalities        MHP/CP

## 2024-08-26 DIAGNOSIS — R56.9 SEIZURES (HCC): ICD-10-CM

## 2024-08-26 RX ORDER — CARBAMAZEPINE 400 MG/1
400 TABLET, EXTENDED RELEASE ORAL 2 TIMES DAILY
Qty: 180 TABLET | Refills: 3 | Status: SHIPPED | OUTPATIENT
Start: 2024-08-26

## 2024-08-27 ENCOUNTER — OFFICE VISIT (OUTPATIENT)
Dept: PHYSICAL THERAPY | Facility: CLINIC | Age: 59
End: 2024-08-27
Payer: COMMERCIAL

## 2024-08-27 DIAGNOSIS — S42.021A CLOSED DISPLACED FRACTURE OF SHAFT OF RIGHT CLAVICLE, INITIAL ENCOUNTER: Primary | ICD-10-CM

## 2024-08-27 DIAGNOSIS — Z87.81 STATUS POST OPEN REDUCTION WITH INTERNAL FIXATION OF FRACTURE: ICD-10-CM

## 2024-08-27 DIAGNOSIS — Z98.890 STATUS POST OPEN REDUCTION WITH INTERNAL FIXATION OF FRACTURE: ICD-10-CM

## 2024-08-27 PROCEDURE — 97110 THERAPEUTIC EXERCISES: CPT

## 2024-08-27 NOTE — PROGRESS NOTES
"Daily Note     Today's date: 2024  Patient name: Jessica Robles  : 1965  MRN: [id number]  Referring provider: Mohit Brooks PA*  Dx:   Encounter Diagnosis     ICD-10-CM    1. Closed displaced fracture of shaft of right clavicle, initial encounter  S42.021A       2. Status post open reduction with internal fixation of fracture  Z98.890     Z87.81                      Subjective: Pt reports that she is doing great. The exercises are going well. She does not really have pain, just a lot of pressure when her right arm is elevated.       Objective: See treatment diary below      Assessment: Tolerated treatment well. Implemented full program today and tolerated well. Verbal and visual cues provided for each exercise to ensure proper technique and mechanics. Pt denied any pain with exercises. Recommended she continued to work on her HEP and may perform gentle scar mobilization at home, visual demonstration provided for this.   Patient would benefit from continued PT      Plan: Progress treatment as tolerated.       Precautions: Hx seizures       HEP:[id number]       Manuals Eval 24      STM, scar mobs as needed                                 Neuro Re-Ed         Pulleys   X20 flx 3\"      Foam roll up wall   X10 head tuck at top      Scap squeeze  5\"x15      AROM PNF patterns   X10 each RUE       Shoulder rolls bwd  x20                      Ther Ex        UBE  2x3' fwd       Wall ball   Small green ball x20 up and down and R, L with rest in between directions       Shoulder taps   2x10       MTP/LTP  MTP Blue 2x10   LTP green 2x10       ER/IR        Banded d2 flexion supine-> standing  Red RUE x12      Banded pull parts   Green 2x10 standing       Tricep ext webslide   Dbl green x30       Supine chest press   3# on cane 3x10               Pt Eval  Pt educated on doctors restrictions and instructed through HEP with visual and verbal education Educated pt on gentle scar mobilization at home to " help decrease adhesion build up/skin tightening. Also reassured her this will improve with time and tissue healing      Ther Activity                        Gait Training                        Modalities        MHP/CP

## 2024-08-29 ENCOUNTER — OFFICE VISIT (OUTPATIENT)
Dept: PHYSICAL THERAPY | Facility: CLINIC | Age: 59
End: 2024-08-29
Payer: COMMERCIAL

## 2024-08-29 DIAGNOSIS — Z87.81 STATUS POST OPEN REDUCTION WITH INTERNAL FIXATION OF FRACTURE: ICD-10-CM

## 2024-08-29 DIAGNOSIS — S42.021A CLOSED DISPLACED FRACTURE OF SHAFT OF RIGHT CLAVICLE, INITIAL ENCOUNTER: Primary | ICD-10-CM

## 2024-08-29 DIAGNOSIS — Z98.890 STATUS POST OPEN REDUCTION WITH INTERNAL FIXATION OF FRACTURE: ICD-10-CM

## 2024-08-29 PROCEDURE — 97535 SELF CARE MNGMENT TRAINING: CPT

## 2024-08-29 PROCEDURE — 97110 THERAPEUTIC EXERCISES: CPT

## 2024-08-29 NOTE — PROGRESS NOTES
"Daily Note     Today's date: 2024  Patient name: Jessica Robles  : 1965  MRN: [id number]  Referring provider: Mohit Brooks PA*  Dx:   Encounter Diagnosis     ICD-10-CM    1. Closed displaced fracture of shaft of right clavicle, initial encounter  S42.021A       2. Status post open reduction with internal fixation of fracture  Z98.890     Z87.81                      Subjective: Pt reports that the little spor of her incision that had the scab keeps pulling open. PT was advised to do very gentle scar mobilization and soft tissue mobilization without  the skin, just gliding. She did this at home with no difficulty the first time and then the second time she said the scab started to bleed again. It appears slightly red to her and she doesn't know why its not healing like the rest.       Objective: See treatment diary below      Assessment: Tolerated treatment well. Incision inspected; small scab left on incision near proximal clavicle with irritation and slight redness localized around scab. Appears to be area of possible incision popping through and/or area of delayed healing. Rest of incision intact and healing well. At this point, no signs of infection. Recommended pt continues to watch this area, may avoid that section for now with her STM. May dab gentle sterile saline solution to this area to keep clean. She reports she may send her doctor a picture through [employer] just to be sure. Pt tolerated all exercises today well with no pain or discomfort. Avoided end ranges as per protocol. Improvements seen in ROM and exercise tolerance.  Patient would benefit from continued PT      Plan: Continue per plan of care.      Precautions: Hx seizures, s/p ORIF R clavicle 24       HEP:[id number]       Manuals Eval 24     STM, scar mobs as needed                                 Neuro Re-Ed         Pulleys   X20 flx 3\" X20 flx 3\"     Foam roll up wall   X10 head tuck at top " "X10  Avoid end range      Scap squeeze  5\"x15 5\"x15     AROM PNF patterns   X10 each RUE  X10 each RUE     Shoulder rolls bwd  x20 X20                      Ther Ex        UBE  2x3' fwd  8' alt      Wall ball   Small green ball x20 up and down and R, L with rest in between directions  Small green ball x20 up and down and R, L with rest in between directions    CW x20      Shoulder taps   2x10  X20 to each shoulder      MTP/LTP  MTP Blue 2x10   LTP green 2x10  MTP Blue 3x10   LTP green 3x10     ER/IR   With towel under arm RUE 2x10 each green      Banded d2 flexion supine-> standing  Red RUE x12 Red RUE 2x10 to 90     Banded pull parts   Green 2x10 standing  Green 2x10 standing      Tricep ext webslide   Dbl green x30  Dbl green x30     Supine chest press   3# on cane 3x10  3# on cane 3x10             Pt Eval  Pt educated on doctors restrictions and instructed through HEP with visual and verbal education Educated pt on gentle scar mobilization at home to help decrease adhesion build up/skin tightening. Also reassured her this will improve with time and tissue healing Pt educated on incision, see above in objective for 8-29     Ther Activity                        Gait Training                        Modalities        MHP/CP                        "

## 2024-09-03 ENCOUNTER — OFFICE VISIT (OUTPATIENT)
Dept: PHYSICAL THERAPY | Facility: CLINIC | Age: 59
End: 2024-09-03
Payer: COMMERCIAL

## 2024-09-03 DIAGNOSIS — S42.021A CLOSED DISPLACED FRACTURE OF SHAFT OF RIGHT CLAVICLE, INITIAL ENCOUNTER: Primary | ICD-10-CM

## 2024-09-03 DIAGNOSIS — Z98.890 STATUS POST OPEN REDUCTION WITH INTERNAL FIXATION OF FRACTURE: ICD-10-CM

## 2024-09-03 DIAGNOSIS — Z87.81 STATUS POST OPEN REDUCTION WITH INTERNAL FIXATION OF FRACTURE: ICD-10-CM

## 2024-09-03 PROCEDURE — 97110 THERAPEUTIC EXERCISES: CPT

## 2024-09-03 PROCEDURE — 97112 NEUROMUSCULAR REEDUCATION: CPT

## 2024-09-03 NOTE — PROGRESS NOTES
"Daily Note     Today's date: 9/3/2024  Patient name: Jessica Robles  : 1965  MRN: [id number]  Referring provider: Mohit Brooks PA*  Dx:   Encounter Diagnosis     ICD-10-CM    1. Closed displaced fracture of shaft of right clavicle, initial encounter  S42.021A       2. Status post open reduction with internal fixation of fracture  Z98.890     Z87.81                      Subjective: Pt reports that she messaged her doctor about the little spot of her incision that kept opening up. She is going to continue to neosporin and be careful with it, if it continues to open up she will go in. Today it is looking better, less redness and scab is just about gone. She feels pretty good overall, continues to get better.       Objective: See treatment diary below      Assessment: Tolerated treatment well. No pain reported throughout the session. Pt continues to improve with exercise tolerance. Minimal verbal cues provided to ensure proper exercise technique and posture. Kept PNF at more midline range to avoid excess horiz adduction. Added a few progressions today and tolerated well. Continue to progress pt as able. 5-10# weight limit continues till 24.Patient would benefit from continued PT      Plan: Progress treatment as tolerated.       Precautions: Hx seizures, s/p ORIF R clavicle 24       HEP:[id number]       Manuals Eval 8-22-24 8-27-24 8-29-24 9-3-24    STM, scar mobs as needed                                 Neuro Re-Ed         Pulleys   X20 flx 3\" X20 flx 3\" X20 flx 3\"    Foam roll up wall   X10 head tuck at top X10  Avoid end range  X10  Avoid end range     Scap squeeze  5\"x15 5\"x15 5\"x15    AROM PNF patterns   X10 each RUE  X10 each RUE X10 each RUE    Shoulder rolls bwd  x20 X20  x20                    Ther Ex        UBE  2x3' fwd  8' alt  8' alt     Wall ball   Small green ball x20 up and down and R, L with rest in between directions  Small green ball x20 up and down and R, L with rest in " between directions    CW x20  Small green ball x20 up and down and R, L with rest in between directions    CW x20     Shoulder taps   2x10  X20 to each shoulder  X20 to each shoulder     MTP/LTP  MTP Blue 2x10   LTP green 2x10  MTP Blue 3x10   LTP green 3x10 3x10 blue each     ER/IR   With towel under arm RUE 2x10 each green  With towel under arm RUE 2x10 each green     Banded d2 flexion supine-> standing  Red RUE x12 Red RUE 2x10 to 90 Red RUE 2x10 to 90 standing       Banded pull parts   Green 2x10 standing  Green 2x10 standing  Green 2x10 standing     Tricep ext webslide   Dbl green x30  Dbl green x30 Dbl blue 3x10     Supine chest press   3# on cane 3x10  3# on cane 3x10 6# on cane 3x10    ABC supine     2# 1 round upper case    Lateral wall walks    Red band loop on wrists x5 down and back    Pt Eval  Pt educated on doctors restrictions and instructed through HEP with visual and verbal education Educated pt on gentle scar mobilization at home to help decrease adhesion build up/skin tightening. Also reassured her this will improve with time and tissue healing Pt educated on incision, see above in objective for 8-29     Ther Activity                        Gait Training                        Modalities        MHP/CP

## 2024-09-05 ENCOUNTER — OFFICE VISIT (OUTPATIENT)
Dept: PHYSICAL THERAPY | Facility: CLINIC | Age: 59
End: 2024-09-05
Payer: COMMERCIAL

## 2024-09-05 DIAGNOSIS — Z87.81 STATUS POST OPEN REDUCTION WITH INTERNAL FIXATION OF FRACTURE: ICD-10-CM

## 2024-09-05 DIAGNOSIS — S42.021A CLOSED DISPLACED FRACTURE OF SHAFT OF RIGHT CLAVICLE, INITIAL ENCOUNTER: Primary | ICD-10-CM

## 2024-09-05 DIAGNOSIS — Z98.890 STATUS POST OPEN REDUCTION WITH INTERNAL FIXATION OF FRACTURE: ICD-10-CM

## 2024-09-05 PROCEDURE — 97110 THERAPEUTIC EXERCISES: CPT

## 2024-09-05 PROCEDURE — 97112 NEUROMUSCULAR REEDUCATION: CPT

## 2024-09-05 NOTE — PROGRESS NOTES
"Daily Note     Today's date: 2024  Patient name: Jessica Robles  : 1965  MRN: [id number]  Referring provider: Mohit Brooks PA*  Dx:   Encounter Diagnosis     ICD-10-CM    1. Closed displaced fracture of shaft of right clavicle, initial encounter  S42.021A       2. Status post open reduction with internal fixation of fracture  Z98.890     Z87.81                      Subjective: Pt reports that she is doing well, scar is doing better too.       Objective: See treatment diary below      Assessment: Tolerated treatment well. Pt tolerated all exercises well. Slight progressions added and d/c come of the simpler exercises to home only. Continue to progress pt as able. Verbal and visual cues provided to ensure proper exercise techniques throughout session. Patient would benefit from continued PT      Plan: Progress treatment as tolerated.       Precautions: Hx seizures, s/p ORIF R clavicle 24       HEP:[id number]       Manuals Eval 8-22-24 8-27-24 8-29-24 9-3-24 9-5-24   STM, scar mobs as needed                                 Neuro Re-Ed         Pulleys   X20 flx 3\" X20 flx 3\" X20 flx 3\" D/c   Foam roll up wall   X10 head tuck at top X10  Avoid end range  X10  Avoid end range  X10  Avoid end range    Scap squeeze  5\"x15 5\"x15 5\"x15 D/c   AROM PNF patterns   X10 each RUE  X10 each RUE X10 each RUE X10 each RUE   Shoulder rolls bwd  x20 X20  x20 x20   Doorway stretch      10\"x12           Ther Ex        UBE  2x3' fwd  8' alt  8' alt   10' alt    Wall ball   Small green ball x20 up and down and R, L with rest in between directions  Small green ball x20 up and down and R, L with rest in between directions    CW x20  Small green ball x20 up and down and R, L with rest in between directions    CW x20  Small green ball x20 up and down and R, L with rest in between directions    CW x20    Shoulder taps   2x10  X20 to each shoulder  X20 to each shoulder  X20 to each shoulder    MTP/LTP  MTP Blue 2x10   LTP " green 2x10  MTP Blue 3x10   LTP green 3x10 3x10 blue each  3x10 blue each    ER/IR   With towel under arm RUE 2x10 each green  With towel under arm RUE 2x10 each green  With towel under arm RUE 2x10 each green    Banded d2 flexion supine-> standing  Red RUE x12 Red RUE 2x10 to 90 Red RUE 2x10 to 90 standing    Red RUE 2x10 to 90 standing    Banded pull parts   Green 2x10 standing  Green 2x10 standing  Green 2x10 standing  Green 2x10 standing    Tricep ext webslide   Dbl green x30  Dbl green x30 Dbl blue 3x10  Dbl blue 3x10    Supine chest press   3# on cane 3x10  3# on cane 3x10 6# on cane 3x10 6# on cane 2x15    ABC supine     2# 1 round upper case 2# 1 round upper case and 1 round lower case   Lateral wall walks    Red band loop on wrists x5 down and back Red band loop on wrists x5 down and back   Pt Eval  Pt educated on doctors restrictions and instructed through HEP with visual and verbal education Educated pt on gentle scar mobilization at home to help decrease adhesion build up/skin tightening. Also reassured her this will improve with time and tissue healing Pt educated on incision, see above in objective for 8-29     Ther Activity                        Gait Training                        Modalities        MHP/CP

## 2024-09-09 ENCOUNTER — OFFICE VISIT (OUTPATIENT)
Dept: PHYSICAL THERAPY | Facility: CLINIC | Age: 59
End: 2024-09-09
Payer: COMMERCIAL

## 2024-09-09 DIAGNOSIS — Z87.81 STATUS POST OPEN REDUCTION WITH INTERNAL FIXATION OF FRACTURE: ICD-10-CM

## 2024-09-09 DIAGNOSIS — S42.021A CLOSED DISPLACED FRACTURE OF SHAFT OF RIGHT CLAVICLE, INITIAL ENCOUNTER: Primary | ICD-10-CM

## 2024-09-09 DIAGNOSIS — Z98.890 STATUS POST OPEN REDUCTION WITH INTERNAL FIXATION OF FRACTURE: ICD-10-CM

## 2024-09-09 PROCEDURE — 97110 THERAPEUTIC EXERCISES: CPT

## 2024-09-09 PROCEDURE — 97530 THERAPEUTIC ACTIVITIES: CPT

## 2024-09-09 NOTE — PROGRESS NOTES
"Daily Note     Today's date: 2024  Patient name: Jessica Robles  : 1965  MRN: [id number]  Referring provider: Mohit Brooks PA*  Dx:   Encounter Diagnosis     ICD-10-CM    1. Closed displaced fracture of shaft of right clavicle, initial encounter  S42.021A       2. Status post open reduction with internal fixation of fracture  Z98.890     Z87.81                      Subjective: Pt reports that she is doing well. She switched topical creams for her scar and this one is helping much more. She is really feeling good with most activities. She would like to work on reaching and improving her lifting in order to return to her exercises such as planks.       Objective: See treatment diary below      Assessment: Tolerated treatment well. Added progressions today. Pt is just over 6 weeks from surgery. Pt tolerated all exercises well. Verbal and visual cues provided to ensure proper exercise techniques. Pt reported that overall she feels the collarbone is heavy after working out but no pain. Patient would benefit from continued PT      Plan: Progress treatment as tolerated.       Precautions: Hx seizures, s/p ORIF R clavicle 24       HEP:[id number]       Manuals 9-9-24 8-27-24 8-29-24 9-3-24 9-5-24   STM, scar mobs as needed                                 Neuro Re-Ed         Pulleys  - X20 flx 3\" X20 flx 3\" X20 flx 3\" D/c   Foam roll up wall   X10 head tuck at top X10  Avoid end range  X10  Avoid end range  X10  Avoid end range    Scap squeeze - 5\"x15 5\"x15 5\"x15 D/c   AROM PNF patterns  X10 each RUE X10 each RUE  X10 each RUE X10 each RUE X10 each RUE   Shoulder rolls bwd X20  x20 X20  x20 x20   Doorway stretch      10\"x12           Ther Ex        UBE  10' alt  2x3' fwd  8' alt  8' alt   10' alt    Wall ball  Small green ball x20 up and down and R, L with rest in between directions    CW x20  Small green ball x20 up and down and R, L with rest in between directions  Small green ball x20 up and down " and R, L with rest in between directions    CW x20  Small green ball x20 up and down and R, L with rest in between directions    CW x20  Small green ball x20 up and down and R, L with rest in between directions    CW x20    Shoulder taps  X3 for 20s plank elevated on bench    Shoulder tricep alt rows on table 2x10 4#     2x10  X20 to each shoulder  X20 to each shoulder  X20 to each shoulder    MTP/LTP 3x10 blue each  MTP Blue 2x10   LTP green 2x10  MTP Blue 3x10   LTP green 3x10 3x10 blue each  3x10 blue each    ER/IR With towel under arm RUE 3x10 each green   With towel under arm RUE 2x10 each green  With towel under arm RUE 2x10 each green  With towel under arm RUE 2x10 each green    Banded d2 flexion supine-> standing Red RUE 2x10 to 90 standing  Red RUE x12 Red RUE 2x10 to 90 Red RUE 2x10 to 90 standing    Red RUE 2x10 to 90 standing    Banded pull parts  Green 3x10 standing  Green 2x10 standing  Green 2x10 standing  Green 2x10 standing  Green 2x10 standing    Tricep ext webslide  Dbl blue 3x10  Dbl green x30  Dbl green x30 Dbl blue 3x10  Dbl blue 3x10    Supine chest press   3# on cane 3x10  3# on cane 3x10 6# on cane 3x10 6# on cane 2x15    Weblside T Blue 3x10        ABC supine     2# 1 round upper case 2# 1 round upper case and 1 round lower case   Lateral wall walks Red band loop on wrists x5 down and back    Wall clocks red x10 (2 dir)   Red band loop on wrists x5 down and back Red band loop on wrists x5 down and back   Pt Eval  Pt educated on doctors restrictions and instructed through HEP with visual and verbal education Educated pt on gentle scar mobilization at home to help decrease adhesion build up/skin tightening. Also reassured her this will improve with time and tissue healing Pt educated on incision, see above in objective for 8-29     Ther Activity        Functional reach  X5 with 4# to tall cabinet    2x10 5#               Gait Training                        Modalities        MHP/CP

## 2024-09-11 ENCOUNTER — OFFICE VISIT (OUTPATIENT)
Dept: PHYSICAL THERAPY | Facility: CLINIC | Age: 59
End: 2024-09-11
Payer: COMMERCIAL

## 2024-09-11 DIAGNOSIS — S42.021A CLOSED DISPLACED FRACTURE OF SHAFT OF RIGHT CLAVICLE, INITIAL ENCOUNTER: Primary | ICD-10-CM

## 2024-09-11 DIAGNOSIS — Z98.890 STATUS POST OPEN REDUCTION WITH INTERNAL FIXATION OF FRACTURE: ICD-10-CM

## 2024-09-11 DIAGNOSIS — Z87.81 STATUS POST OPEN REDUCTION WITH INTERNAL FIXATION OF FRACTURE: ICD-10-CM

## 2024-09-11 PROCEDURE — 97110 THERAPEUTIC EXERCISES: CPT

## 2024-09-11 PROCEDURE — 97112 NEUROMUSCULAR REEDUCATION: CPT

## 2024-09-11 NOTE — PROGRESS NOTES
"Daily Note     Today's date: 2024  Patient name: Jessica Robles  : 1965  MRN: [id number]  Referring provider: Mohit Brooks PA*  Dx:   Encounter Diagnosis     ICD-10-CM    1. Closed displaced fracture of shaft of right clavicle, initial encounter  S42.021A       2. Status post open reduction with internal fixation of fracture  Z98.890     Z87.81                      Subjective: Pt reports that she is doing well. No pain after last session just a little fatigue. She feels that with some overhead reaching she feels a pull near her collar bone but that's it.       Objective: See treatment diary below      Assessment: Tolerated treatment well. Pt reported pulling near her collarbone with lateral wall clocks, held last 5 reps. No pain reported throughout session. Verbal and visual cues provided to ensure proper exercise technique. Pt is progressing through exercises and protocol well with no difficulty. Patient would benefit from continued PT      Plan: Progress treatment as tolerated.       Precautions: Hx seizures, s/p ORIF R clavicle 24       HEP:[id number]       Manuals 9-9-24 9-11-24 8-29-24 9-3-24 9-5-24   STM, scar mobs as needed                                 Neuro Re-Ed         Pulleys  -  X20 flx 3\" X20 flx 3\" D/c   Foam roll up wall   x10 X10  Avoid end range  X10  Avoid end range  X10  Avoid end range    Scap squeeze -  5\"x15 5\"x15 D/c   AROM PNF patterns  X10 each RUE X10 each RUE X10 each RUE X10 each RUE X10 each RUE   Shoulder rolls bwd X20  x20 X20  x20 x20   Doorway stretch  10\"x12  10\"x12   10\"x12           Ther Ex        UBE  10' alt  10'alt  8' alt  8' alt   10' alt    Wall ball  Small green ball x20 up and down and R, L with rest in between directions    CW x20  4 ways x20 each  Small green ball x20 up and down and R, L with rest in between directions    CW x20  Small green ball x20 up and down and R, L with rest in between directions    CW x20  Small green ball x20 up and " down and R, L with rest in between directions    CW x20    Shoulder taps  X3 for 20s plank elevated on bench    Shoulder tricep alt rows on table 2x10 4#     X3 for 20s plank elevated on bench    Shoulder tricep alt rows on table 2x10 4# X20 to each shoulder  X20 to each shoulder  X20 to each shoulder    MTP/LTP 3x10 blue each  3x10 blue each  MTP Blue 3x10   LTP green 3x10 3x10 blue each  3x10 blue each    ER/IR With towel under arm RUE 3x10 each green  With towel under arm RUE 3x10 each green  With towel under arm RUE 2x10 each green  With towel under arm RUE 2x10 each green  With towel under arm RUE 2x10 each green    Banded d2 flexion supine-> standing Red RUE 2x10 to 90 standing  Red RUE 2x10 to 90 standing  Red RUE 2x10 to 90 Red RUE 2x10 to 90 standing    Red RUE 2x10 to 90 standing    Banded pull parts  Green 3x10 standing   Green 2x10 standing  Green 2x10 standing  Green 2x10 standing    Tricep ext webslide  Dbl blue 3x10  Dbl blue 3x10  Dbl green x30 Dbl blue 3x10  Dbl blue 3x10    Supine chest press    3# on cane 3x10 6# on cane 3x10 6# on cane 2x15    Weblside T Blue 3x10        ABC supine   2# 1 round upper case and 1 round lower case  2# 1 round upper case 2# 1 round upper case and 1 round lower case   Lateral wall walks Red band loop on wrists x5 down and back    Wall clocks red x10 (2 dir) Red band loop on wrists x5 down and back    Wall clocks red x5 (2 dir)  Red band loop on wrists x5 down and back Red band loop on wrists x5 down and back   Pt Eval  Pt educated on doctors restrictions and instructed through HEP with visual and verbal education  Pt educated on incision, see above in objective for 8-29     Ther Activity        Functional reach  X5 with 4# to tall cabinet    2x10 5# Functional reach to cabinet single arm 5# x10  X5    Both arms 7# x10              Gait Training                        Modalities        MHP/CP

## 2024-09-17 ENCOUNTER — OFFICE VISIT (OUTPATIENT)
Dept: PHYSICAL THERAPY | Facility: CLINIC | Age: 59
End: 2024-09-17
Payer: COMMERCIAL

## 2024-09-17 DIAGNOSIS — Z98.890 STATUS POST OPEN REDUCTION WITH INTERNAL FIXATION OF FRACTURE: ICD-10-CM

## 2024-09-17 DIAGNOSIS — Z87.81 STATUS POST OPEN REDUCTION WITH INTERNAL FIXATION OF FRACTURE: ICD-10-CM

## 2024-09-17 DIAGNOSIS — S42.021A CLOSED DISPLACED FRACTURE OF SHAFT OF RIGHT CLAVICLE, INITIAL ENCOUNTER: Primary | ICD-10-CM

## 2024-09-17 PROCEDURE — 97530 THERAPEUTIC ACTIVITIES: CPT

## 2024-09-17 PROCEDURE — 97110 THERAPEUTIC EXERCISES: CPT

## 2024-09-17 NOTE — PROGRESS NOTES
"Daily Note     Today's date: 2024  Patient name: Jessica Robles  : 1965  MRN: [id number]  Referring provider: Mohit Brooks PA*  Dx:   Encounter Diagnosis     ICD-10-CM    1. Closed displaced fracture of shaft of right clavicle, initial encounter  S42.021A       2. Status post open reduction with internal fixation of fracture  Z98.890     Z87.81                      Subjective: Pt reports that she is feeling good. Not really getting that heaviness feeling in the arm, even after exercise.       Objective: See treatment diary below      Assessment: Tolerated treatment well. Added progression to program today. Patient is about 7.5 weeks post operative and doing well, progressing through protocol without difficulty. Patient reports no pain in the shoulder or RUE. Good mechanics demonstrated with exercises today. Patient has returned to most ADLs, and lifting activities. She limited how much she lifts and will eventually return to [employer]. Plan for next session to be last. Will provide new and updated HEP. Patient would benefit from continued PT      Plan: Progress treatment as tolerated.       Precautions: Hx seizures, s/p ORIF R clavicle 24       HEP:[id number]       Manuals 24 924 24 9-3-24 9-5-24   STM, scar mobs as needed                                 Neuro Re-Ed         Pulleys  -   X20 flx 3\" D/c   Foam roll up wall   x10 x12 X10  Avoid end range  X10  Avoid end range    Scap squeeze -   5\"x15 D/c   AROM PNF patterns  X10 each RUE X10 each RUE X10 each RUE X10 each RUE X10 each RUE   Shoulder rolls bwd X20  x20 x20 x20 x20   Doorway stretch  10\"x12  10\"x12 10\"x12  10\"x12           Ther Ex        UBE  10' alt  10'alt  10'alt Lv2 8' alt   10' alt    Wall ball  Small green ball x20 up and down and R, L with rest in between directions    CW x20  4 ways x20 each  4 ways x20 each  Small green ball x20 up and down and R, L with rest in between directions    CW x20  Small green " ball x20 up and down and R, L with rest in between directions    CW x20    Shoulder taps  X3 for 20s plank elevated on bench    Shoulder tricep alt rows on table 2x10 4#     X3 for 20s plank elevated on bench    Shoulder tricep alt rows on table 2x10 4# X1 plank on chair   X1 plank elbows extended on mat then on forearms x1 for x10 sec each     Shoulder tricep alt rows on table 2x10 6# X20 to each shoulder  X20 to each shoulder    MTP/LTP 3x10 blue each  3x10 blue each  3x10 MTP Blk  3x10 LTP Blue  3x10 blue each  3x10 blue each    ER/IR With towel under arm RUE 3x10 each green  With towel under arm RUE 3x10 each green  With towel under arm RUE 3x10 each green  With towel under arm RUE 2x10 each green  With towel under arm RUE 2x10 each green    Banded d2 flexion supine-> standing Red RUE 2x10 to 90 standing  Red RUE 2x10 to 90 standing  Red RUE 2x10 to 90 standing  Red RUE 2x10 to 90 standing    Red RUE 2x10 to 90 standing    Banded pull parts  Green 3x10 standing   Blue 2x10  Green 2x10 standing  Green 2x10 standing    Tricep ext webslide  Dbl blue 3x10  Dbl blue 3x10  Dbl blue 3x10  Dbl blue 3x10  Dbl blue 3x10    Supine chest press    On reclined bench 5# 2x10  6# on cane 3x10 6# on cane 2x15    Weblside T Blue 3x10        ABC supine   2# 1 round upper case and 1 round lower case  2# 1 round upper case 2# 1 round upper case and 1 round lower case   Lateral wall walks Red band loop on wrists x5 down and back    Wall clocks red x10 (2 dir) Red band loop on wrists x5 down and back    Wall clocks red x5 (2 dir) Red band loop on wrists x8 down and back    Wall clocks red x8 (2 dir) Red band loop on wrists x5 down and back Red band loop on wrists x5 down and back   Pt Eval  Pt educated on doctors restrictions and instructed through HEP with visual and verbal education       Ther Activity        Functional reach  X5 with 4# to tall cabinet    2x10 5# Functional reach to cabinet single arm 5# x10  X5    Both arms 7# x10  Functional reach to cabinet single arm 5# x10  X5    Both arms 7# x10             Gait Training                        Modalities        MHP/CP

## 2024-09-19 ENCOUNTER — OFFICE VISIT (OUTPATIENT)
Dept: PHYSICAL THERAPY | Facility: CLINIC | Age: 59
End: 2024-09-19
Payer: COMMERCIAL

## 2024-09-19 DIAGNOSIS — S42.021A CLOSED DISPLACED FRACTURE OF SHAFT OF RIGHT CLAVICLE, INITIAL ENCOUNTER: Primary | ICD-10-CM

## 2024-09-19 DIAGNOSIS — Z98.890 STATUS POST OPEN REDUCTION WITH INTERNAL FIXATION OF FRACTURE: ICD-10-CM

## 2024-09-19 DIAGNOSIS — Z87.81 STATUS POST OPEN REDUCTION WITH INTERNAL FIXATION OF FRACTURE: ICD-10-CM

## 2024-09-19 PROCEDURE — 97110 THERAPEUTIC EXERCISES: CPT

## 2024-09-19 PROCEDURE — 97530 THERAPEUTIC ACTIVITIES: CPT

## 2024-09-19 NOTE — PROGRESS NOTES
"Daily Note     Today's date: 2024  Patient name: Jessica Robles  : 1965  MRN: [id number]  Referring provider: Mohit Brooks PA*  Dx:   Encounter Diagnosis     ICD-10-CM    1. Closed displaced fracture of shaft of right clavicle, initial encounter  S42.021A       2. Status post open reduction with internal fixation of fracture  Z98.890     Z87.81                      Subjective: Pt reports that she is doing well today. Offers no complaints today in regard to her RUE.       Objective: See treatment diary below      Assessment: Tolerated treatment well. Updated HEP with strengthening and stability exercises. Recommended patient continues to keep up with these and eventually returns to sport like activities per protocol. Patient has continued to progress through exercises and demonstrates good mechanics with all exercises. Educated pt to avoid repetitive end ranges, and end range horiz add for a few more weeks. At this point, patient is discharged to HEP.  Patient would benefit from continued PT      Plan: Plan for discharge to HEP and gym program.      Precautions: Hx seizures, s/p ORIF R clavicle 24       HEP:[id number]       Manuals 24 924   STM, scar mobs as needed                                 Neuro Re-Ed         Pulleys  -    D/c   Foam roll up wall   x10 x12 x12 X10  Avoid end range    Scap squeeze -    D/c   AROM PNF patterns  X10 each RUE X10 each RUE X10 each RUE X10 each RUE X10 each RUE   Shoulder rolls bwd X20  x20 x20 X20  x20   Doorway stretch  10\"x12  10\"x12 10\"x12 10\"x12 10\"x12           Ther Ex        UBE  10' alt  10'alt  10'alt Lv2 10'alt Lv2  10' alt    Wall ball  Small green ball x20 up and down and R, L with rest in between directions    CW x20  4 ways x20 each  4 ways x20 each  4 ways x20 each  Small green ball x20 up and down and R, L with rest in between directions    CW x20    Shoulder taps  X3 for 20s plank elevated on bench    Shoulder " tricep alt rows on table 2x10 4#     X3 for 20s plank elevated on bench    Shoulder tricep alt rows on table 2x10 4# X1 plank on chair   X1 plank elbows extended on mat then on forearms x1 for x10 sec each     Shoulder tricep alt rows on table 2x10 6#  X20 to each shoulder    MTP/LTP 3x10 blue each  3x10 blue each  3x10 MTP Blk  3x10 LTP Blue  3x10 blk 3x10 blue each    ER/IR With towel under arm RUE 3x10 each green  With towel under arm RUE 3x10 each green  With towel under arm RUE 3x10 each green  With towel under arm RUE 3x10 each green  With towel under arm RUE 2x10 each green    Banded d2 flexion supine-> standing Red RUE 2x10 to 90 standing  Red RUE 2x10 to 90 standing  Red RUE 2x10 to 90 standing  Red RUE 2x10 to 90 standing  Red RUE 2x10 to 90 standing    Banded pull parts  Green 3x10 standing   Blue 2x10  Blue 2x10  Green 2x10 standing    Tricep ext webslide  Dbl blue 3x10  Dbl blue 3x10  Dbl blue 3x10  Dbl blue 3x10  Dbl blue 3x10    Supine chest press    On reclined bench 5# 2x10  On reclined bench 5# 3x10  6# on cane 2x15    Weblside T Blue 3x10    Green 3x10    ABC supine   2# 1 round upper case and 1 round lower case   2# 1 round upper case and 1 round lower case   Lateral wall walks Red band loop on wrists x5 down and back    Wall clocks red x10 (2 dir) Red band loop on wrists x5 down and back    Wall clocks red x5 (2 dir) Red band loop on wrists x8 down and back    Wall clocks red x8 (2 dir) Red band loop on wrists x8 down and back    Wall clocks red x8 (2 dir) Red band loop on wrists x5 down and back   90-90 ER    Dbl green 2x8 with manual and verbal cues     Pt Edu Pt educated on doctors restrictions and instructed through HEP with visual and verbal education       Ther Activity        Functional reach  X5 with 4# to tall cabinet    2x10 5# Functional reach to cabinet single arm 5# x10  X5    Both arms 7# x10 Functional reach to cabinet single arm 5# x10  X5    Both arms 7# x10 Functional reach to  cabinet single arm 5# 2x10  X5    Both arms 7# 2x10            Gait Training                        Modalities        MHP/CP

## 2024-10-21 ENCOUNTER — OFFICE VISIT (OUTPATIENT)
Dept: OBGYN CLINIC | Facility: CLINIC | Age: 59
End: 2024-10-21

## 2024-10-21 VITALS — WEIGHT: 113 LBS | BODY MASS INDEX: 19.29 KG/M2 | HEIGHT: 64 IN

## 2024-10-21 DIAGNOSIS — S42.021D CLOSED DISPLACED FRACTURE OF SHAFT OF RIGHT CLAVICLE WITH ROUTINE HEALING, SUBSEQUENT ENCOUNTER: Primary | ICD-10-CM

## 2024-10-21 PROCEDURE — 99024 POSTOP FOLLOW-UP VISIT: CPT | Performed by: ORTHOPAEDIC SURGERY

## 2024-10-21 NOTE — PROGRESS NOTES
Subjective   CHIEF COMPLAINT/REASON FOR VISIT  Jessica Robles is a 59 y.o. female who presents for 3 month post op follow-up after OPEN REDUCTION W/ INTERNAL FIXATION (ORIF) CLAVICLE plus all other indicated procedures - Right on 7/26/2024     HISTORY OF PRESENT ILLNESS  Overall, she feels things are going well and progressing appropriately. Pain has been well controlled. She has been following the post-operative rehabilitation regimen without difficultly. Currently, she is doing well without any specific concerns.    Objective   PHYSICAL EXAMINATION  General:   Well-appearing  No acute distress  Appears stated age    right Shoulder  Negative tenderness to palpation over the acromioclavicular joint  Negative tenderness to palpation over the long head of the biceps tendon  Shoulder effusion none present  Shoulder abduction 180/180  Shoulder forward flexion 180/180  Shoulder internal rotation T7/T7  Supraspinatus/ABD 5/5   Infraspinatus/ER 5/5   Subscapularis/IR 5/5   Negative Belly Press/SS  Negative Neer  Negative Siddiqui  Negative O'briens  Skin is intact with no erythema, warmth or drainage. Surgical incision well healed  Motor strength intact distally  Sensation to light touch is normal in the axillary, radial, ulnar, and median nerve distributions.  Fingers warm and perfused    Assessment & Plan   1. Status Post OPEN REDUCTION W/ INTERNAL FIXATION (ORIF) CLAVICLE plus all other indicated procedures - Right    Patient is making appropriate progress from the date of their surgery  Encouraged patient to continue life long strengthening of the shoulder joint to protect the shoulder/clavicle  We did discuss at least 6-7 months post op for hardware removal should this become symptomatic  After discussion, patient preferred to hold off on additional x-rays today  We will plan to see her back as needed  If any issues, questions, or concerns arise between now and the next appointment, we have encouraged the patient  contact our team.    Scribe Attestation      I,:  Mohit Brooks PA-C am acting as a scribe while in the presence of the attending physician.:       I,:  Boris Loco MD personally performed the services described in this documentation    as scribed in my presence.:

## 2025-02-02 DIAGNOSIS — E78.2 MIXED HYPERLIPIDEMIA: ICD-10-CM

## 2025-02-03 RX ORDER — ROSUVASTATIN CALCIUM 20 MG/1
20 TABLET, COATED ORAL DAILY
Qty: 90 TABLET | Refills: 1 | Status: SHIPPED | OUTPATIENT
Start: 2025-02-03

## 2025-04-30 LAB
25(OH)D3+25(OH)D2 SERPL-MCNC: 57.1 NG/ML (ref 30–100)
ALBUMIN SERPL-MCNC: 4.1 G/DL (ref 3.9–4.9)
ALP SERPL-CCNC: 92 IU/L (ref 44–121)
ALT SERPL-CCNC: 16 IU/L (ref 0–32)
AST SERPL-CCNC: 19 IU/L (ref 0–40)
BASOPHILS # BLD AUTO: 0 X10E3/UL (ref 0–0.2)
BASOPHILS NFR BLD AUTO: 1 %
BILIRUB SERPL-MCNC: 0.3 MG/DL (ref 0–1.2)
BUN SERPL-MCNC: 22 MG/DL (ref 8–27)
BUN/CREAT SERPL: 34 (ref 12–28)
CALCIUM SERPL-MCNC: 9.1 MG/DL (ref 8.7–10.3)
CARBAMAZEPINE SERPL-MCNC: 8.3 UG/ML (ref 4–12)
CHLORIDE SERPL-SCNC: 103 MMOL/L (ref 96–106)
CHOLEST SERPL-MCNC: 199 MG/DL (ref 100–199)
CO2 SERPL-SCNC: 24 MMOL/L (ref 20–29)
CREAT SERPL-MCNC: 0.65 MG/DL (ref 0.57–1)
EGFR: 100 ML/MIN/1.73
EOSINOPHIL # BLD AUTO: 0.2 X10E3/UL (ref 0–0.4)
EOSINOPHIL NFR BLD AUTO: 3 %
ERYTHROCYTE [DISTWIDTH] IN BLOOD BY AUTOMATED COUNT: 12.2 % (ref 11.7–15.4)
GLOBULIN SER-MCNC: 2.4 G/DL (ref 1.5–4.5)
GLUCOSE SERPL-MCNC: 78 MG/DL (ref 70–99)
HCT VFR BLD AUTO: 42.4 % (ref 34–46.6)
HDLC SERPL-MCNC: 101 MG/DL
HGB BLD-MCNC: 13.9 G/DL (ref 11.1–15.9)
IMM GRANULOCYTES # BLD: 0 X10E3/UL (ref 0–0.1)
IMM GRANULOCYTES NFR BLD: 0 %
LDLC SERPL CALC-MCNC: 86 MG/DL (ref 0–99)
LYMPHOCYTES # BLD AUTO: 1.5 X10E3/UL (ref 0.7–3.1)
LYMPHOCYTES NFR BLD AUTO: 30 %
MCH RBC QN AUTO: 31.2 PG (ref 26.6–33)
MCHC RBC AUTO-ENTMCNC: 32.8 G/DL (ref 31.5–35.7)
MCV RBC AUTO: 95 FL (ref 79–97)
MONOCYTES # BLD AUTO: 0.3 X10E3/UL (ref 0.1–0.9)
MONOCYTES NFR BLD AUTO: 7 %
NEUTROPHILS # BLD AUTO: 2.9 X10E3/UL (ref 1.4–7)
NEUTROPHILS NFR BLD AUTO: 59 %
PLATELET # BLD AUTO: 170 X10E3/UL (ref 150–450)
POTASSIUM SERPL-SCNC: 4.9 MMOL/L (ref 3.5–5.2)
PROT SERPL-MCNC: 6.5 G/DL (ref 6–8.5)
RBC # BLD AUTO: 4.45 X10E6/UL (ref 3.77–5.28)
SL AMB VLDL CHOLESTEROL CALC: 12 MG/DL (ref 5–40)
SODIUM SERPL-SCNC: 140 MMOL/L (ref 134–144)
TRIGL SERPL-MCNC: 66 MG/DL (ref 0–149)
TSH SERPL DL<=0.005 MIU/L-ACNC: 2.11 UIU/ML (ref 0.45–4.5)
WBC # BLD AUTO: 4.9 X10E3/UL (ref 3.4–10.8)

## 2025-05-14 ENCOUNTER — OFFICE VISIT (OUTPATIENT)
Dept: FAMILY MEDICINE CLINIC | Facility: CLINIC | Age: 60
End: 2025-05-14
Payer: COMMERCIAL

## 2025-05-14 VITALS
SYSTOLIC BLOOD PRESSURE: 118 MMHG | HEART RATE: 62 BPM | DIASTOLIC BLOOD PRESSURE: 76 MMHG | RESPIRATION RATE: 18 BRPM | TEMPERATURE: 96.2 F | WEIGHT: 116.8 LBS | HEIGHT: 64 IN | OXYGEN SATURATION: 98 % | BODY MASS INDEX: 19.94 KG/M2

## 2025-05-14 DIAGNOSIS — Z13.820 OSTEOPOROSIS SCREENING: ICD-10-CM

## 2025-05-14 DIAGNOSIS — Z12.11 COLON CANCER SCREENING: Primary | ICD-10-CM

## 2025-05-14 DIAGNOSIS — Z00.00 ANNUAL PHYSICAL EXAM: ICD-10-CM

## 2025-05-14 DIAGNOSIS — E78.2 MIXED HYPERLIPIDEMIA: ICD-10-CM

## 2025-05-14 DIAGNOSIS — E55.9 VITAMIN D DEFICIENCY: Primary | ICD-10-CM

## 2025-05-14 DIAGNOSIS — Z12.31 ENCOUNTER FOR SCREENING MAMMOGRAM FOR BREAST CANCER: ICD-10-CM

## 2025-05-14 DIAGNOSIS — R56.9 SEIZURES (HCC): ICD-10-CM

## 2025-05-14 DIAGNOSIS — M81.0 AGE-RELATED OSTEOPOROSIS WITHOUT CURRENT PATHOLOGICAL FRACTURE: ICD-10-CM

## 2025-05-14 PROCEDURE — 99396 PREV VISIT EST AGE 40-64: CPT | Performed by: FAMILY MEDICINE

## 2025-05-14 RX ORDER — CARBAMAZEPINE 400 MG/1
400 TABLET, EXTENDED RELEASE ORAL 2 TIMES DAILY
Qty: 180 TABLET | Refills: 3 | Status: SHIPPED | OUTPATIENT
Start: 2025-05-14 | End: 2025-05-14 | Stop reason: SDUPTHER

## 2025-05-14 RX ORDER — CARBAMAZEPINE 400 MG/1
400 TABLET, EXTENDED RELEASE ORAL 2 TIMES DAILY
Qty: 180 TABLET | Refills: 3 | Status: SHIPPED | OUTPATIENT
Start: 2025-05-14

## 2025-05-14 NOTE — ASSESSMENT & PLAN NOTE
Stable seizure-free continuing on Tegretol renew medication at this time check Tegretol level yearly follow-up neurology as needed basis    Orders:    carBAMazepine (TEGretol XR) 400 mg 12 hr tablet; Take 1 tablet (400 mg total) by mouth 2 (two) times a day Tegretol XR brand-name necessary

## 2025-05-14 NOTE — PROGRESS NOTES
Adult Annual Physical  Name: Jessica Robles      : 1965      MRN: [id number]  Encounter Provider: Raymundo Jones DO  Encounter Date: 2025   Encounter department: [facility]    :  Assessment & Plan  Encounter for screening mammogram for breast cancer    Orders:    Mammo screening bilateral w 3d and cad; Future    Colon cancer screening    Orders:    Cologuard    Osteoporosis screening    Orders:    DXA bone density spine hip and pelvis; Future    Seizures (HCC)  Stable seizure-free continuing on Tegretol renew medication at this time check Tegretol level yearly follow-up neurology as needed basis    Orders:    carBAMazepine (TEGretol XR) 400 mg 12 hr tablet; Take 1 tablet (400 mg total) by mouth 2 (two) times a day Tegretol XR brand-name necessary    Annual physical exam         Age-related osteoporosis without current pathological fracture  Monitor DEXA scan every 2 years continue calcium and vitamin D during exercise         Mixed hyperlipidemia  Monitor lipid profile continue heart healthy diet continue Crestor 20 mg daily    Orders:    Carbamazepine level, total; Future    Comprehensive metabolic panel; Future    CBC and differential; Future    Lipid panel; Future    TSH, 3rd generation with Free T4 reflex; Future        Preventive Screenings:  - Diabetes Screening: screening up-to-date  - Cholesterol Screening: screening not indicated and has hyperlipidemia   - Hepatitis C screening: screening up-to-date   - Lung cancer screening: screening not indicated   - Osteoporosis screening: has osteoporosis and screening not indicated     Immunizations:  - Immunizations due: Zoster (Shingrix)         History of Present Illness     Adult Annual Physical:  Patient presents for annual physical.     Diet and Physical Activity:  - Diet/Nutrition: well balanced diet.  - Exercise: walking, moderate cardiovascular exercise and 3-4 times a week on average.    Depression Screening:  -  "PHQ-2 Score: 0    General Health:  - Sleep: 4-6 hours of sleep on average and 7-8 hours of sleep on average.  - Hearing: normal hearing left ear and decreased hearing right ear.  - Vision: most recent eye exam > 1 year ago, wears glasses and wears contacts.  - Dental: regular dental visits and brushes teeth three times daily.    /GYN Health:  - Follows with GYN: yes.   - Menopause: postmenopausal.   - History of STDs: no  - Contraception: male partner had vasectomy.      Advanced Care Planning:  - Has an advanced directive?: no    - Has a durable medical POA?: no      Review of Systems   Constitutional:  Negative for chills, fatigue and fever.   HENT:  Negative for congestion, nosebleeds, rhinorrhea, sinus pressure and sore throat.    Eyes:  Negative for discharge and redness.   Respiratory:  Negative for cough and shortness of breath.    Cardiovascular:  Negative for chest pain, palpitations and leg swelling.   Gastrointestinal:  Negative for abdominal pain, blood in stool and nausea.   Endocrine: Negative for cold intolerance, heat intolerance and polyuria.   Genitourinary:  Negative for dysuria and frequency.   Musculoskeletal:  Negative for arthralgias, back pain and myalgias.   Skin:  Negative for rash.   Neurological:  Negative for dizziness, weakness and headaches.   Hematological:  Negative for adenopathy.   Psychiatric/Behavioral:  Negative for behavioral problems and sleep disturbance. The patient is not nervous/anxious.          Objective   /76 (BP Location: Left arm, Patient Position: Sitting, Cuff Size: Standard)   Pulse 62   Temp (!) 96.2 °F (35.7 °C) (Tympanic)   Resp 18   Ht 5' 4\" (1.626 m)   Wt 53 kg (116 lb 12.8 oz)   SpO2 98%   BMI 20.05 kg/m²     Physical Exam  Vitals and nursing note reviewed.   Constitutional:       General: She is not in acute distress.     Appearance: Normal appearance. She is well-developed.   HENT:      Head: Normocephalic and atraumatic.      Right Ear: " Tympanic membrane and external ear normal.      Left Ear: Tympanic membrane and external ear normal.      Nose: Nose normal.      Mouth/Throat:      Mouth: Mucous membranes are moist.      Pharynx: Oropharynx is clear. No oropharyngeal exudate.     Eyes:      General: No scleral icterus.        Right eye: No discharge.         Left eye: No discharge.      Conjunctiva/sclera: Conjunctivae normal.      Pupils: Pupils are equal, round, and reactive to light.     Neck:      Thyroid: No thyromegaly.      Vascular: No JVD.     Cardiovascular:      Rate and Rhythm: Normal rate and regular rhythm.      Heart sounds: Normal heart sounds. No murmur heard.  Pulmonary:      Effort: Pulmonary effort is normal.      Breath sounds: No wheezing or rales.   Chest:      Chest wall: No tenderness.   Abdominal:      General: Bowel sounds are normal. There is no distension.      Palpations: Abdomen is soft. There is no mass.      Tenderness: There is no abdominal tenderness.     Musculoskeletal:         General: No tenderness or deformity. Normal range of motion.      Cervical back: Normal range of motion.   Lymphadenopathy:      Cervical: No cervical adenopathy.     Skin:     General: Skin is warm and dry.      Findings: No rash.     Neurological:      General: No focal deficit present.      Mental Status: She is alert and oriented to person, place, and time.      Cranial Nerves: No cranial nerve deficit.      Coordination: Coordination normal.      Deep Tendon Reflexes: Reflexes are normal and symmetric. Reflexes normal.     Psychiatric:         Mood and Affect: Mood normal.         Behavior: Behavior normal.         Thought Content: Thought content normal.         Judgment: Judgment normal.

## 2025-05-14 NOTE — ASSESSMENT & PLAN NOTE
Monitor lipid profile continue heart healthy diet continue Crestor 20 mg daily    Orders:    Carbamazepine level, total; Future    Comprehensive metabolic panel; Future    CBC and differential; Future    Lipid panel; Future    TSH, 3rd generation with Free T4 reflex; Future

## 2025-05-27 LAB — COLOGUARD RESULT REPORTABLE: NEGATIVE

## 2025-06-03 ENCOUNTER — DOCUMENTATION (OUTPATIENT)
Dept: ADMINISTRATIVE | Facility: OTHER | Age: 60
End: 2025-06-03

## 2025-06-03 NOTE — PROGRESS NOTES
06/03/25 1:02 PM    CRC outreach is not required, Colonoscopy/ FIT/ other screening completed. Cologuard completed 5/19/25.    Thank you.  Darcie Sin MA  [facility]

## 2025-06-13 ENCOUNTER — HOSPITAL ENCOUNTER (OUTPATIENT)
Dept: MAMMOGRAPHY | Facility: CLINIC | Age: 60
End: 2025-06-13
Payer: COMMERCIAL

## 2025-06-13 DIAGNOSIS — Z12.31 VISIT FOR SCREENING MAMMOGRAM: ICD-10-CM

## 2025-06-13 PROCEDURE — 77067 SCR MAMMO BI INCL CAD: CPT

## 2025-06-13 PROCEDURE — 77063 BREAST TOMOSYNTHESIS BI: CPT

## 2025-06-18 DIAGNOSIS — E78.2 MIXED HYPERLIPIDEMIA: ICD-10-CM

## 2025-06-19 RX ORDER — ROSUVASTATIN CALCIUM 20 MG/1
20 TABLET, COATED ORAL DAILY
Qty: 90 TABLET | Refills: 3 | Status: SHIPPED | OUTPATIENT
Start: 2025-06-19

## 2025-06-26 DIAGNOSIS — R56.9 SEIZURES (HCC): ICD-10-CM

## 2025-06-26 RX ORDER — CARBAMAZEPINE 400 MG/1
400 TABLET, EXTENDED RELEASE ORAL 2 TIMES DAILY
Qty: 180 TABLET | Refills: 3 | Status: SHIPPED | OUTPATIENT
Start: 2025-06-26

## 2025-07-03 DIAGNOSIS — R56.9 SEIZURES (HCC): ICD-10-CM

## 2025-07-03 RX ORDER — CARBAMAZEPINE 400 MG/1
400 TABLET, EXTENDED RELEASE ORAL 2 TIMES DAILY
Qty: 200 TABLET | Refills: 1 | Status: SHIPPED | OUTPATIENT
Start: 2025-07-03

## 2025-07-03 RX ORDER — CARBAMAZEPINE 400 MG/1
400 TABLET, EXTENDED RELEASE ORAL 2 TIMES DAILY
Qty: 180 TABLET | Refills: 1 | Status: SHIPPED | OUTPATIENT
Start: 2025-07-03 | End: 2025-07-03 | Stop reason: SDUPTHER

## 2025-07-03 NOTE — TELEPHONE ENCOUNTER
Patient stopped in to  her prescription for this medication and she stated that the pharmacy will only accept 200 tablets. Patient asking if you can put in a script for 200 instead of 180

## 2025-07-03 NOTE — TELEPHONE ENCOUNTER
Patient called back stating she is leaving this weekend for [place] and really needs this script today.  Please advise when approved and printed so patient can pick it up.

## 2025-07-03 NOTE — TELEPHONE ENCOUNTER
Message sent from pod that patient is leaving to [place] and needs her medication script printed out to day.

## 2025-07-03 NOTE — TELEPHONE ENCOUNTER
- Patient called the refill line stating that the pharmacy is out of stock and needs to have this script PRINTED. Please contact the patient when script is ready for pickup     Reason for call:   [x] Refill   [] Prior Auth  [] Other:     Office:   [x] PCP/Provider -   [] Specialty/Provider -     Medication:   - Carbamazepine 400 mg- take 1 tablet by mouth 2 times a day       Pharmacy: [facility]    Local Pharmacy   Does the patient have enough for 3 days?   [] Yes   [x] No - Send as HP to POD    Mail Away Pharmacy   Does the patient have enough for 10 days?   [] Yes   [] No - Send as HP to POD

## (undated) DEVICE — 10FR FRAZIER SUCTION HANDLE: Brand: CARDINAL HEALTH

## (undated) DEVICE — DRAPE C-ARM X-RAY

## (undated) DEVICE — DRESSING MEPILEX AG BORDER POST-OP 4 X 8 IN

## (undated) DEVICE — SUT MONOCRYL 3-0 PS-2 27 IN Y427H

## (undated) DEVICE — PLUMEPEN PRO 10FT

## (undated) DEVICE — NEEDLE 18 G X 1 1/2

## (undated) DEVICE — GLOVE INDICATOR PI UNDERGLOVE SZ 8 BLUE

## (undated) DEVICE — SURGICAL GOWN, XL SMARTSLEEVE: Brand: CONVERTORS

## (undated) DEVICE — SYRINGE 20ML LL

## (undated) DEVICE — UNDYED BRAIDED (POLYGLACTIN 910), SYNTHETIC ABSORBABLE SUTURE: Brand: COATED VICRYL

## (undated) DEVICE — SUT STRATAFIX SPIRAL MONOCRYL PLUS 2-0 CT-1 30CM SXMP1B412

## (undated) DEVICE — SCD SEQUENTIAL COMPRESSION COMFORT SLEEVE MEDIUM KNEE LENGTH: Brand: KENDALL SCD

## (undated) DEVICE — GLOVE SRG BIOGEL 7.5

## (undated) DEVICE — INTENDED FOR TISSUE SEPARATION, AND OTHER PROCEDURES THAT REQUIRE A SHARP SURGICAL BLADE TO PUNCTURE OR CUT.: Brand: BARD-PARKER ® CARBON RIB-BACK BLADES

## (undated) DEVICE — 2.0MM DRILL BIT QC 140MM 60MM CALIBRATION

## (undated) DEVICE — ANTIBACTERIAL UNDYED BRAIDED (POLYGLACTIN 910), SYNTHETIC ABSORBABLE SUTURE: Brand: COATED VICRYL

## (undated) DEVICE — DRESSING MEPILEX AG BORDER 4 X 4 IN

## (undated) DEVICE — 3M™ STERI-DRAPE™ U-DRAPE 1015: Brand: STERI-DRAPE™

## (undated) DEVICE — 2.5MM DRILL BIT QC 170MM 80MM CALIBRATION

## (undated) DEVICE — EXOFIN PRECISION PEN HIGH VISCOSITY TOPICAL SKIN ADHESIVE: Brand: EXOFIN PRECISION PEN, 1G

## (undated) DEVICE — ARM SLING: Brand: DEROYAL

## (undated) DEVICE — CYSTO TUBING SINGLE IRRIGATION

## (undated) DEVICE — BETHLEHEM TOTAL HIP, KIT: Brand: CARDINAL HEALTH

## (undated) DEVICE — SUT VICRYL 0 CT-1 36 IN J946H